# Patient Record
Sex: FEMALE | Race: WHITE | NOT HISPANIC OR LATINO | Employment: FULL TIME | URBAN - METROPOLITAN AREA
[De-identification: names, ages, dates, MRNs, and addresses within clinical notes are randomized per-mention and may not be internally consistent; named-entity substitution may affect disease eponyms.]

---
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
## 2021-07-01 ENCOUNTER — LAB REQUISITION (OUTPATIENT)
Dept: LAB | Facility: HOSPITAL | Age: 21
End: 2021-07-01

## 2021-07-01 DIAGNOSIS — Z00.00 ENCOUNTER FOR GENERAL ADULT MEDICAL EXAMINATION WITHOUT ABNORMAL FINDINGS: ICD-10-CM

## 2021-07-01 LAB — SARS-COV-2 ORF1AB RESP QL NAA+PROBE: NOT DETECTED

## 2021-07-01 PROCEDURE — U0004 COV-19 TEST NON-CDC HGH THRU: HCPCS | Performed by: OTOLARYNGOLOGY

## 2021-07-02 ENCOUNTER — HOSPITAL ENCOUNTER (OUTPATIENT)
Dept: CARDIOLOGY | Facility: HOSPITAL | Age: 21
Discharge: HOME | End: 2021-07-02

## 2021-07-02 ENCOUNTER — TRANSCRIBE ORDERS (OUTPATIENT)
Dept: ADMINISTRATIVE | Facility: HOSPITAL | Age: 21
End: 2021-07-02

## 2021-07-02 ENCOUNTER — LAB (OUTPATIENT)
Dept: LAB | Facility: HOSPITAL | Age: 21
End: 2021-07-02

## 2021-07-02 DIAGNOSIS — Z01.818 PRE-OP EXAM: ICD-10-CM

## 2021-07-02 DIAGNOSIS — Z01.818 PRE-OP EXAM: Primary | ICD-10-CM

## 2021-07-02 LAB
ANION GAP SERPL CALCULATED.3IONS-SCNC: 8.3 MMOL/L (ref 5–15)
APTT PPP: 28.3 SECONDS (ref 22.7–35.4)
BUN SERPL-MCNC: 16 MG/DL (ref 6–20)
BUN/CREAT SERPL: 17.8 (ref 7–25)
CALCIUM SPEC-SCNC: 8.9 MG/DL (ref 8.6–10.5)
CHLORIDE SERPL-SCNC: 105 MMOL/L (ref 98–107)
CO2 SERPL-SCNC: 23.7 MMOL/L (ref 22–29)
CREAT SERPL-MCNC: 0.9 MG/DL (ref 0.57–1)
DEPRECATED RDW RBC AUTO: 48 FL (ref 37–54)
ERYTHROCYTE [DISTWIDTH] IN BLOOD BY AUTOMATED COUNT: 12.7 % (ref 12.3–15.4)
GFR SERPL CREATININE-BSD FRML MDRD: 79 ML/MIN/1.73
GLUCOSE SERPL-MCNC: 82 MG/DL (ref 65–99)
HCG INTACT+B SERPL-ACNC: <0.5 MIU/ML
HCT VFR BLD AUTO: 42.9 % (ref 34–46.6)
HGB BLD-MCNC: 13.9 G/DL (ref 12–15.9)
INR PPP: 0.93 (ref 0.9–1.1)
MCH RBC QN AUTO: 32.3 PG (ref 26.6–33)
MCHC RBC AUTO-ENTMCNC: 32.4 G/DL (ref 31.5–35.7)
MCV RBC AUTO: 99.5 FL (ref 79–97)
PLATELET # BLD AUTO: 395 10*3/MM3 (ref 140–450)
PMV BLD AUTO: 9.3 FL (ref 6–12)
POTASSIUM SERPL-SCNC: 4.9 MMOL/L (ref 3.5–5.2)
PROTHROMBIN TIME: 12.3 SECONDS (ref 11.7–14.2)
QT INTERVAL: 392 MS
RBC # BLD AUTO: 4.31 10*6/MM3 (ref 3.77–5.28)
SODIUM SERPL-SCNC: 137 MMOL/L (ref 136–145)
WBC # BLD AUTO: 9.84 10*3/MM3 (ref 3.4–10.8)

## 2021-07-02 PROCEDURE — 85730 THROMBOPLASTIN TIME PARTIAL: CPT

## 2021-07-02 PROCEDURE — 85610 PROTHROMBIN TIME: CPT

## 2021-07-02 PROCEDURE — 80048 BASIC METABOLIC PNL TOTAL CA: CPT

## 2021-07-02 PROCEDURE — 93005 ELECTROCARDIOGRAM TRACING: CPT | Performed by: OTOLARYNGOLOGY

## 2021-07-02 PROCEDURE — 85027 COMPLETE CBC AUTOMATED: CPT

## 2021-07-02 PROCEDURE — 36415 COLL VENOUS BLD VENIPUNCTURE: CPT

## 2021-07-02 PROCEDURE — 93010 ELECTROCARDIOGRAM REPORT: CPT | Performed by: INTERNAL MEDICINE

## 2021-07-02 PROCEDURE — 84702 CHORIONIC GONADOTROPIN TEST: CPT

## 2021-07-06 ENCOUNTER — LAB REQUISITION (OUTPATIENT)
Dept: LAB | Facility: HOSPITAL | Age: 21
End: 2021-07-06

## 2021-07-06 DIAGNOSIS — Z00.00 ENCOUNTER FOR GENERAL ADULT MEDICAL EXAMINATION WITHOUT ABNORMAL FINDINGS: ICD-10-CM

## 2021-07-06 PROCEDURE — 88304 TISSUE EXAM BY PATHOLOGIST: CPT | Performed by: OTOLARYNGOLOGY

## 2021-07-07 LAB
LAB AP CASE REPORT: NORMAL
LAB AP CLINICAL INFORMATION: NORMAL
PATH REPORT.FINAL DX SPEC: NORMAL
PATH REPORT.GROSS SPEC: NORMAL

## 2021-08-14 ENCOUNTER — IMMUNIZATION (OUTPATIENT)
Dept: VACCINE CLINIC | Facility: HOSPITAL | Age: 21
End: 2021-08-14

## 2021-08-14 PROCEDURE — 0001A: CPT | Performed by: INTERNAL MEDICINE

## 2021-08-14 PROCEDURE — 91300 HC SARSCOV02 VAC 30MCG/0.3ML IM: CPT | Performed by: INTERNAL MEDICINE

## 2021-09-04 ENCOUNTER — IMMUNIZATION (OUTPATIENT)
Dept: VACCINE CLINIC | Facility: HOSPITAL | Age: 21
End: 2021-09-04

## 2021-09-04 PROCEDURE — 91300 HC SARSCOV02 VAC 30MCG/0.3ML IM: CPT | Performed by: INTERNAL MEDICINE

## 2021-09-04 PROCEDURE — 0002A: CPT | Performed by: INTERNAL MEDICINE

## 2022-12-09 ENCOUNTER — INITIAL PRENATAL (OUTPATIENT)
Dept: OBSTETRICS AND GYNECOLOGY | Facility: CLINIC | Age: 22
End: 2022-12-09

## 2022-12-09 VITALS — SYSTOLIC BLOOD PRESSURE: 112 MMHG | DIASTOLIC BLOOD PRESSURE: 73 MMHG | WEIGHT: 189 LBS | BODY MASS INDEX: 31.45 KG/M2

## 2022-12-09 DIAGNOSIS — Z34.90 EARLY STAGE OF PREGNANCY: Primary | ICD-10-CM

## 2022-12-09 DIAGNOSIS — Z34.90 PREGNANCY WITH UNCERTAIN DATES, ANTEPARTUM: ICD-10-CM

## 2022-12-09 LAB
B-HCG UR QL: POSITIVE
EXPIRATION DATE: ABNORMAL
GLUCOSE UR STRIP-MCNC: NEGATIVE MG/DL
INTERNAL NEGATIVE CONTROL: NEGATIVE
INTERNAL POSITIVE CONTROL: POSITIVE
Lab: ABNORMAL
PROT UR STRIP-MCNC: NEGATIVE MG/DL

## 2022-12-09 PROCEDURE — 0501F PRENATAL FLOW SHEET: CPT | Performed by: OBSTETRICS & GYNECOLOGY

## 2022-12-09 PROCEDURE — 81025 URINE PREGNANCY TEST: CPT | Performed by: OBSTETRICS & GYNECOLOGY

## 2022-12-09 RX ORDER — DEXTROAMPHETAMINE SACCHARATE, AMPHETAMINE ASPARTATE MONOHYDRATE, DEXTROAMPHETAMINE SULFATE AND AMPHETAMINE SULFATE 5; 5; 5; 5 MG/1; MG/1; MG/1; MG/1
20 CAPSULE, EXTENDED RELEASE ORAL DAILY
COMMUNITY
Start: 2022-10-26 | End: 2023-01-06

## 2022-12-09 RX ORDER — BUSPIRONE HYDROCHLORIDE 5 MG/1
7.5 TABLET ORAL
COMMUNITY
Start: 2022-10-26 | End: 2023-01-06

## 2022-12-09 RX ORDER — PRENATAL VIT/IRON FUM/FOLIC AC 27MG-0.8MG
1 TABLET ORAL DAILY
COMMUNITY

## 2022-12-09 NOTE — PROGRESS NOTES
Chief Complaint   Patient presents with   • Initial Prenatal Visit     HPI- Pt is 22 y.o.  at Unknown here for prenatal visit.  Patient presents for initial OB visit.  She is unsure regarding her LMP but states it was before .  She has no complaints today.  She states she has had 1 episode of nausea and vomiting.  She has no major underlying medical problems and is taking prenatal vitamins.    ROS-     - No vaginal bleeding    GI- No abdominal pain    /73   Wt 85.7 kg (189 lb)   LMP  (LMP Unknown)   BMI 31.45 kg/m²   Exam - See flow sheet    Fetal heart rate is normal    Assessment-  Diagnoses and all orders for this visit:    Early stage of pregnancy  -     OB Panel With HIV  -     Urine Culture - Urine, Urine, Clean Catch  -     IGP,CtNgTv,rfx Aptima HPV ASCU  -     Drug Profile Urine - 9 Drugs - Urine, Clean Catch    Pregnancy with uncertain dates, antepartum  -     US Ob Transvaginal; Future    Other orders  -     POC Urinalysis Dipstick  -     Prenatal Vit-Fe Fumarate-FA (prenatal vitamin 27-0.8) 27-0.8 MG tablet tablet; Take 1 tablet by mouth Daily.      Initial OB counseling was done.  Discussed delivering hospital and call system.  OB labs and ultrasound were ordered and she will follow-up in 4 weeks.

## 2022-12-10 LAB
ABO GROUP BLD: ABNORMAL
AMPHETAMINES UR QL SCN: NEGATIVE NG/ML
BARBITURATES UR QL SCN: NEGATIVE NG/ML
BASOPHILS # BLD AUTO: 0.1 X10E3/UL (ref 0–0.2)
BASOPHILS NFR BLD AUTO: 1 %
BENZODIAZ UR QL: NEGATIVE NG/ML
BLD GP AB SCN SERPL QL: NEGATIVE
BZE UR QL: NEGATIVE NG/ML
CANNABINOIDS UR QL SCN: NEGATIVE NG/ML
EOSINOPHIL # BLD AUTO: 0.5 X10E3/UL (ref 0–0.4)
EOSINOPHIL NFR BLD AUTO: 5 %
ERYTHROCYTE [DISTWIDTH] IN BLOOD BY AUTOMATED COUNT: 11.8 % (ref 11.7–15.4)
HBV SURFACE AG SERPL QL IA: NEGATIVE
HCT VFR BLD AUTO: 40 % (ref 34–46.6)
HCV AB S/CO SERPL IA: 0.2 S/CO RATIO (ref 0–0.9)
HGB BLD-MCNC: 13.9 G/DL (ref 11.1–15.9)
HIV 1+2 AB+HIV1 P24 AG SERPL QL IA: NON REACTIVE
IMM GRANULOCYTES # BLD AUTO: 0 X10E3/UL (ref 0–0.1)
IMM GRANULOCYTES NFR BLD AUTO: 0 %
LYMPHOCYTES # BLD AUTO: 2.3 X10E3/UL (ref 0.7–3.1)
LYMPHOCYTES NFR BLD AUTO: 20 %
MCH RBC QN AUTO: 32 PG (ref 26.6–33)
MCHC RBC AUTO-ENTMCNC: 34.8 G/DL (ref 31.5–35.7)
MCV RBC AUTO: 92 FL (ref 79–97)
METHADONE UR QL SCN: NEGATIVE NG/ML
MONOCYTES # BLD AUTO: 0.9 X10E3/UL (ref 0.1–0.9)
MONOCYTES NFR BLD AUTO: 8 %
NEUTROPHILS # BLD AUTO: 7.6 X10E3/UL (ref 1.4–7)
NEUTROPHILS NFR BLD AUTO: 66 %
OPIATES UR QL: NEGATIVE NG/ML
PCP UR QL: NEGATIVE NG/ML
PLATELET # BLD AUTO: 431 X10E3/UL (ref 150–450)
PROPOXYPH UR QL SCN: NEGATIVE NG/ML
RBC # BLD AUTO: 4.35 X10E6/UL (ref 3.77–5.28)
RH BLD: POSITIVE
RPR SER QL: NON REACTIVE
RUBV IGG SERPL IA-ACNC: 1.51 INDEX
WBC # BLD AUTO: 11.4 X10E3/UL (ref 3.4–10.8)

## 2022-12-11 LAB
BACTERIA UR CULT: NORMAL
BACTERIA UR CULT: NORMAL

## 2022-12-21 LAB
C TRACH RRNA CVX QL NAA+PROBE: NEGATIVE
CONV .: ABNORMAL
CYTOLOGIST CVX/VAG CYTO: ABNORMAL
CYTOLOGY CVX/VAG DOC CYTO: ABNORMAL
CYTOLOGY CVX/VAG DOC THIN PREP: ABNORMAL
DX ICD CODE: ABNORMAL
DX ICD CODE: ABNORMAL
HIV 1 & 2 AB SER-IMP: ABNORMAL
HPV I/H RISK 4 DNA CVX QL PROBE+SIG AMP: POSITIVE
N GONORRHOEA RRNA CVX QL NAA+PROBE: NEGATIVE
OTHER STN SPEC: ABNORMAL
PATHOLOGIST CVX/VAG CYTO: ABNORMAL
RECOM F/U CVX/VAG CYTO: ABNORMAL
STAT OF ADQ CVX/VAG CYTO-IMP: ABNORMAL
T VAGINALIS RRNA SPEC QL NAA+PROBE: NEGATIVE

## 2023-01-06 ENCOUNTER — ROUTINE PRENATAL (OUTPATIENT)
Dept: OBSTETRICS AND GYNECOLOGY | Facility: CLINIC | Age: 23
End: 2023-01-06
Payer: COMMERCIAL

## 2023-01-06 VITALS — SYSTOLIC BLOOD PRESSURE: 110 MMHG | DIASTOLIC BLOOD PRESSURE: 75 MMHG | WEIGHT: 193 LBS | BODY MASS INDEX: 32.12 KG/M2

## 2023-01-06 DIAGNOSIS — Z34.01 ENCOUNTER FOR SUPERVISION OF NORMAL FIRST PREGNANCY IN FIRST TRIMESTER: Primary | ICD-10-CM

## 2023-01-06 DIAGNOSIS — Z36.0 ENCOUNTER FOR ANTENATAL SCREENING FOR CHROMOSOMAL ANOMALIES: ICD-10-CM

## 2023-01-06 LAB
GLUCOSE UR STRIP-MCNC: NEGATIVE MG/DL
PROT UR STRIP-MCNC: NEGATIVE MG/DL

## 2023-01-06 PROCEDURE — 0502F SUBSEQUENT PRENATAL CARE: CPT | Performed by: OBSTETRICS & GYNECOLOGY

## 2023-01-06 NOTE — PROGRESS NOTES
CC:  Pregnancy  She is overall doing well.  She does have some round ligament pain.  She does report nausea in the morning, but states that saltines help with the nausea and she declines any antinausea medication.  Reviewed initial OB labs and discussed ASCUS Pap that was positive for HPV and discussed recommendations for repeat Pap at 1 year.  She does desire cell free DNA testing and order was placed.  Ultrasound today confirms a viable pregnancy at 9 weeks 5 days.  A/P: Supervision of pregnancy at 9 weeks  -- Follow-up in 4 weeks

## 2023-01-13 LAB
CFDNA.FET/CFDNA.TOTAL SFR FETUS: NORMAL %
CITATION REF LAB TEST: NORMAL
FET 13+18+21+X+Y ANEUP PLAS.CFDNA: NEGATIVE
FET CHR 21 TS PLAS.CFDNA QL: NEGATIVE
FET SEX PLAS.CFDNA DOSAGE CFDNA: NORMAL
FET TS 13 RISK PLAS.CFDNA QL: NEGATIVE
FET TS 18 RISK WBC.DNA+CFDNA QL: NEGATIVE
GA EST FROM CONCEPTION DATE: NORMAL D
GESTATIONAL AGE > 9:: YES
LAB DIRECTOR NAME PROVIDER: NORMAL
LAB DIRECTOR NAME PROVIDER: NORMAL
LABORATORY COMMENT REPORT: NORMAL
LIMITATIONS OF THE TEST: NORMAL
NEGATIVE PREDICTIVE VALUE: NORMAL
NOTE: NORMAL
PERFORMANCE CHARACTERISTICS: NORMAL
POSITIVE PREDICTIVE VALUE: NORMAL
REF LAB TEST METHOD: NORMAL
TEST PERFORMANCE INFO SPEC: NORMAL

## 2023-01-16 ENCOUNTER — TELEPHONE (OUTPATIENT)
Dept: OBSTETRICS AND GYNECOLOGY | Facility: CLINIC | Age: 23
End: 2023-01-16
Payer: COMMERCIAL

## 2023-01-16 NOTE — TELEPHONE ENCOUNTER
----- Message from Akila Miranda MD sent at 1/16/2023  8:10 AM EST -----  Please let patient know that her genetic testing was normal, and if she wants to know gender, it showed a female fetus

## 2023-01-17 ENCOUNTER — TELEPHONE (OUTPATIENT)
Dept: OBSTETRICS AND GYNECOLOGY | Facility: CLINIC | Age: 23
End: 2023-01-17
Payer: COMMERCIAL

## 2023-01-17 NOTE — TELEPHONE ENCOUNTER
----- Message from Akila Miranda MD sent at 1/17/2023 12:15 PM EST -----  Regarding: FW: Depression   Contact: [phone]  Can you please reach out to the patient with counseling services  ----- Message -----  From: Fanny Jeffers RN  Sent: 1/17/2023  12:05 PM EST  To: Akila Miranda MD  Subject: Depression                                       My Chart response. 11wk2d. OB 2/3/23. Rosalina has decided against restarting Lexapro, but would like suggestions/referral for counseling. Thank you.      ----- Message -----  From: Rosalina Pardo  Sent: 1/17/2023  11:42 AM EST  To: [facility]  Subject: Depression                                       Yes counseling would be helpful if you have any suggestions

## 2023-01-17 NOTE — TELEPHONE ENCOUNTER
----- Message from Akila Miranda MD sent at 1/17/2023 10:18 AM EST -----  Regarding: FW: Depression   Contact: [phone]  I can always restart her Lexapro if she would like to go back on that.  The risks of Lexapro in pregnancy are very small and the risk of uncontrolled depression or anxiety are greater to her pregnancy.  ----- Message -----  From: Fanny Jeffers RN  Sent: 1/16/2023   4:02 PM EST  To: Akila Miranda MD  Subject: RE: Depression                                   My Chart. 11wk1d. OB 2/3/23. Stopped taking her lexapro when she found out she is pregnant. Anything to help?  I sent a message of [facility] pharmacy on file. Thank you.    ----- Message -----  From: Rosalina Redman  Sent: 1/16/2023   3:47 PM EST  To: [facility]  Subject: Depression                                       I have had very bad depression the last week and I’m not sure what to do or if there is anything to help me I stopped taking my alexpro when I found out I was pregnant     I’m hoping it’s just the 1st trimester so maybe it will get better in a couple weeks   Thank you   Rosalina redman

## 2023-01-17 NOTE — TELEPHONE ENCOUNTER
L/m for pt to call.      Also sent Prixtel msg to pt with the following information, please relay if she calls back:     Your referral and records have been faxed to [facility].  Please call them at your earliest convenience to schedule an appointment, at [phone] option 3.        Pt # [phone]

## 2023-01-17 NOTE — TELEPHONE ENCOUNTER
Left message for Rosalina that Dr Miranda said she can always restart your Lexapro if you would like to go back on that. The risks of Lexapro in pregnancy are very small and the risk of uncontrolled depression or anxiety are greater to your pregnancy. I will leave a My Chart message as well. Thank you.

## 2023-01-18 NOTE — TELEPHONE ENCOUNTER
On 1/18/23 at 8:16 am, pt viewed mychart msg with instructions to call [facility] to schedule.

## 2023-02-03 ENCOUNTER — ROUTINE PRENATAL (OUTPATIENT)
Dept: OBSTETRICS AND GYNECOLOGY | Facility: CLINIC | Age: 23
End: 2023-02-03
Payer: COMMERCIAL

## 2023-02-03 VITALS — SYSTOLIC BLOOD PRESSURE: 112 MMHG | BODY MASS INDEX: 32.78 KG/M2 | DIASTOLIC BLOOD PRESSURE: 75 MMHG | WEIGHT: 197 LBS

## 2023-02-03 DIAGNOSIS — G93.5 CHIARI I MALFORMATION: ICD-10-CM

## 2023-02-03 DIAGNOSIS — Z34.02 ENCOUNTER FOR SUPERVISION OF NORMAL FIRST PREGNANCY IN SECOND TRIMESTER: Primary | ICD-10-CM

## 2023-02-03 PROCEDURE — 0502F SUBSEQUENT PRENATAL CARE: CPT | Performed by: OBSTETRICS & GYNECOLOGY

## 2023-02-03 NOTE — PROGRESS NOTES
CC:  Pregnancy  Patient is doing well today with no major complaints.  Patient was last seen by neurology in 2019 and I have advised her to see them again to ensure that she is okay to have a vaginal delivery with her history of Chiari I malformation.  She previously saw [facility] and consult was placed for her to reestablish care with them.  A/P:  Supervision of pregnancy at 13 weeks with history of Chiari I malformation  -- Follow-up in 4 weeks

## 2023-02-07 ENCOUNTER — TELEPHONE (OUTPATIENT)
Dept: OBSTETRICS AND GYNECOLOGY | Facility: CLINIC | Age: 23
End: 2023-02-07
Payer: COMMERCIAL

## 2023-02-07 NOTE — TELEPHONE ENCOUNTER
FYI. Patient was referred to Neurology to see Dr Xander Houston. Office called stating Dr Houston only sees patients at  hospital  not in office. Asked if she may be switch to another doctor in practice. Per Davida informed them she may.

## 2023-02-14 ENCOUNTER — TELEPHONE (OUTPATIENT)
Dept: OBSTETRICS AND GYNECOLOGY | Facility: CLINIC | Age: 23
End: 2023-02-14
Payer: COMMERCIAL

## 2023-02-14 NOTE — TELEPHONE ENCOUNTER
Appears neuro contacted our office on 2/7/23 regarding pt's referral.  We gave permission to schedule with another provider.  As of 2/14/23, appt still not scheduled.      L/m for pt to call.  Please provide her with phone number to neurology to call ASAP to schedule appt, [phone].     Pt # [phone]

## 2023-02-14 NOTE — TELEPHONE ENCOUNTER
Patient is calling to let provider know that nobody has contacted her within this week about the referral.    Thank you!

## 2023-02-20 NOTE — TELEPHONE ENCOUNTER
Referral/faxed to [facility] for scheduling.  They will call pt to schedule.  Office # [phone]

## 2023-02-20 NOTE — TELEPHONE ENCOUNTER
Patient would like to have referral sent to another office. I told patient she could research any neurology office. She said to send in anywhere.     Thank you!

## 2023-03-03 ENCOUNTER — ROUTINE PRENATAL (OUTPATIENT)
Dept: OBSTETRICS AND GYNECOLOGY | Facility: CLINIC | Age: 23
End: 2023-03-03
Payer: COMMERCIAL

## 2023-03-03 VITALS — BODY MASS INDEX: 33.78 KG/M2 | WEIGHT: 203 LBS | SYSTOLIC BLOOD PRESSURE: 107 MMHG | DIASTOLIC BLOOD PRESSURE: 72 MMHG

## 2023-03-03 DIAGNOSIS — Z36.86 ENCOUNTER FOR ANTENATAL SCREENING FOR CERVICAL LENGTH: ICD-10-CM

## 2023-03-03 DIAGNOSIS — Z36.89 ENCOUNTER FOR FETAL ANATOMIC SURVEY: ICD-10-CM

## 2023-03-03 DIAGNOSIS — R82.90 ABNORMAL URINALYSIS: Primary | ICD-10-CM

## 2023-03-03 DIAGNOSIS — G93.5 CHIARI I MALFORMATION: ICD-10-CM

## 2023-03-03 DIAGNOSIS — Z3A.17 17 WEEKS GESTATION OF PREGNANCY: ICD-10-CM

## 2023-03-03 LAB
GLUCOSE UR STRIP-MCNC: NEGATIVE MG/DL
PROT UR STRIP-MCNC: NEGATIVE MG/DL

## 2023-03-03 PROCEDURE — 0502F SUBSEQUENT PRENATAL CARE: CPT | Performed by: OBSTETRICS & GYNECOLOGY

## 2023-03-03 RX ORDER — NITROFURANTOIN 25; 75 MG/1; MG/1
100 CAPSULE ORAL 2 TIMES DAILY
Qty: 14 CAPSULE | Refills: 0 | Status: SHIPPED | OUTPATIENT
Start: 2023-03-03 | End: 2023-03-10

## 2023-03-03 NOTE — PROGRESS NOTES
CC:  Pregnancy  Patient is overall doing well and has no major complaints.  She is scheduled in May to see neurology due to history of Chiari I malformation.  Discussed recommendations for anatomy ultrasound in 3 weeks.  She was offered screening for spina bifida and declines.  Her urinalysis was positive for nitrates today and she is denying any dysuria.  Urine culture has been sent and I have advised her to go ahead and start antibiotics until culture has returned.  A/P: Supervision of pregnancy at 17 weeks with history of Chiari I malformation and abnormal urinalysis  -- Follow-up in 3 weeks with anatomy ultrasound

## 2023-03-06 ENCOUNTER — TELEPHONE (OUTPATIENT)
Dept: OBSTETRICS AND GYNECOLOGY | Facility: CLINIC | Age: 23
End: 2023-03-06
Payer: COMMERCIAL

## 2023-03-06 NOTE — TELEPHONE ENCOUNTER
Patient 18 weeks pregnant. Due for next OB and anatomy  ultrasound 3/24. She wants to wait until 4/19 due to school. Is this OK?

## 2023-03-09 LAB
BACTERIA UR CULT: ABNORMAL
BACTERIA UR CULT: ABNORMAL
OTHER ANTIBIOTIC SUSC ISLT: ABNORMAL

## 2023-04-19 ENCOUNTER — ROUTINE PRENATAL (OUTPATIENT)
Dept: OBSTETRICS AND GYNECOLOGY | Facility: CLINIC | Age: 23
End: 2023-04-19
Payer: COMMERCIAL

## 2023-04-19 VITALS — SYSTOLIC BLOOD PRESSURE: 100 MMHG | DIASTOLIC BLOOD PRESSURE: 66 MMHG | WEIGHT: 210 LBS | BODY MASS INDEX: 34.95 KG/M2

## 2023-04-19 DIAGNOSIS — Z13.1 SCREENING FOR DIABETES MELLITUS: ICD-10-CM

## 2023-04-19 DIAGNOSIS — Z3A.24 24 WEEKS GESTATION OF PREGNANCY: ICD-10-CM

## 2023-04-19 DIAGNOSIS — Z13.0 SCREENING FOR IRON DEFICIENCY ANEMIA: ICD-10-CM

## 2023-04-19 DIAGNOSIS — G93.5 CHIARI I MALFORMATION: ICD-10-CM

## 2023-04-19 DIAGNOSIS — O23.42 URINARY TRACT INFECTION IN MOTHER DURING SECOND TRIMESTER OF PREGNANCY: Primary | ICD-10-CM

## 2023-04-19 PROCEDURE — 0502F SUBSEQUENT PRENATAL CARE: CPT | Performed by: OBSTETRICS & GYNECOLOGY

## 2023-04-19 NOTE — PROGRESS NOTES
CC:  Pregnancy  She is doing well and has no major complaints.  She does report good fetal movement.  She was positive for a UTI at her last visit and she states she did complete the entire antibiotic.  We will resend urine today.  She is scheduled in May to see neurology regarding her Chiari malformation.  Discussed 1 hour glucose test with her next visit.  A/P: Supervision of pregnancy at 24 weeks with history of Chiari I malformation and UTI  -- Follow-up in 4 weeks

## 2023-04-24 LAB
BACTERIA UR CULT: ABNORMAL
BACTERIA UR CULT: ABNORMAL
OTHER ANTIBIOTIC SUSC ISLT: ABNORMAL

## 2023-04-25 RX ORDER — NITROFURANTOIN 25; 75 MG/1; MG/1
100 CAPSULE ORAL 2 TIMES DAILY
Qty: 14 CAPSULE | Refills: 0 | Status: SHIPPED | OUTPATIENT
Start: 2023-04-25 | End: 2023-05-02

## 2023-05-26 ENCOUNTER — TELEPHONE (OUTPATIENT)
Dept: NEUROSURGERY | Facility: CLINIC | Age: 23
End: 2023-05-26
Payer: COMMERCIAL

## 2023-05-26 ENCOUNTER — ROUTINE PRENATAL (OUTPATIENT)
Dept: OBSTETRICS AND GYNECOLOGY | Facility: CLINIC | Age: 23
End: 2023-05-26
Payer: COMMERCIAL

## 2023-05-26 ENCOUNTER — OFFICE VISIT (OUTPATIENT)
Dept: NEUROLOGY | Facility: CLINIC | Age: 23
End: 2023-05-26
Payer: COMMERCIAL

## 2023-05-26 VITALS
HEART RATE: 72 BPM | SYSTOLIC BLOOD PRESSURE: 98 MMHG | DIASTOLIC BLOOD PRESSURE: 68 MMHG | OXYGEN SATURATION: 98 % | HEIGHT: 65 IN | BODY MASS INDEX: 34.95 KG/M2

## 2023-05-26 VITALS — WEIGHT: 215 LBS | DIASTOLIC BLOOD PRESSURE: 77 MMHG | BODY MASS INDEX: 35.78 KG/M2 | SYSTOLIC BLOOD PRESSURE: 111 MMHG

## 2023-05-26 DIAGNOSIS — G93.5 CHIARI I MALFORMATION: Primary | ICD-10-CM

## 2023-05-26 DIAGNOSIS — G93.5 CHIARI I MALFORMATION: ICD-10-CM

## 2023-05-26 DIAGNOSIS — O99.210 MATERNAL OBESITY AFFECTING PREGNANCY, ANTEPARTUM: ICD-10-CM

## 2023-05-26 DIAGNOSIS — Z3A.29 29 WEEKS GESTATION OF PREGNANCY: ICD-10-CM

## 2023-05-26 DIAGNOSIS — O23.42 URINARY TRACT INFECTION IN MOTHER DURING SECOND TRIMESTER OF PREGNANCY: Primary | ICD-10-CM

## 2023-05-26 LAB
ERYTHROCYTE [DISTWIDTH] IN BLOOD BY AUTOMATED COUNT: 12 % (ref 12.3–15.4)
GLUCOSE UR STRIP-MCNC: NEGATIVE MG/DL
HCT VFR BLD AUTO: 35.4 % (ref 34–46.6)
HGB BLD-MCNC: 12.3 G/DL (ref 12–15.9)
MCH RBC QN AUTO: 32.1 PG (ref 26.6–33)
MCHC RBC AUTO-ENTMCNC: 34.7 G/DL (ref 31.5–35.7)
MCV RBC AUTO: 92.4 FL (ref 79–97)
PLATELET # BLD AUTO: 349 10*3/MM3 (ref 140–450)
PROT UR STRIP-MCNC: NEGATIVE MG/DL
RBC # BLD AUTO: 3.83 10*6/MM3 (ref 3.77–5.28)
WBC # BLD AUTO: 14.67 10*3/MM3 (ref 3.4–10.8)

## 2023-05-26 NOTE — LETTER
May 26, 2023       No Recipients    Patient: Rosalina Pardo   YOB: 2000   Date of Visit: 2023       Dear Dr. Ruiz Recipients:    Thank you for referring Rosalina Pardo to me for evaluation. Below are the relevant portions of my assessment and plan of care.    If you have questions, please do not hesitate to call me. I look forward to following Rosalina along with you.         Sincerely,        Torres Triana II, MD        CC:   No Recipients    Torres Triana II, MD  23 1021  Signed  Chief Complaint   Patient presents with   • Chiari malformation       Patient ID: Rosalina Pardo is a 23 y.o. female.    HPI: I had the pleasure of seeing your patient today.  As you may know she is a 23-year-old female being seen at the request of and referred to us by IKER Kohler for the evaluation of Chiari malformation.  She last had an MRI back in  showing borderline Chiari I malformation with 5 mm of tonsillar ectopia.  There was no significant pointing of the tonsils or mass effect appreciated at the cervical medullary junction.  This appearance was unchanged from her previous MRI from 2012.  She has also a thoracic syrinx.  She has been seen by neurosurgery in the past and at that time she was not in need of surgical intervention.  She is currently pregnant and due in August.  They had a specific question for us regarding method of delivery of her child.  She would like to have a vaginal delivery however the OB/GYN team is inquiring about the possible necessity of .  She would prefer vaginal delivery.  She has not seen Dr. Mendez in quite some time.  She denies any new onset focal weakness or numbness of her arms or legs.  No cape like numbness of her arms or back.  No balance issues from baseline.  No vision changes.    The following portions of the patient's history were reviewed and updated as appropriate: allergies, current medications, past family history, past medical  history, past social history, past surgical history and problem list.    Review of Systems   Constitutional: Negative for chills, fatigue and fever.   HENT: Negative for hearing loss, tinnitus and trouble swallowing.    Eyes: Negative for photophobia, redness and visual disturbance.   Respiratory: Negative for cough, shortness of breath and wheezing.    Cardiovascular: Negative for chest pain, palpitations and leg swelling.   Gastrointestinal: Negative for diarrhea, nausea and vomiting.   Endocrine: Negative for cold intolerance, heat intolerance and polydipsia.   Genitourinary: Negative for decreased urine volume, difficulty urinating and urgency.   Musculoskeletal: Negative for back pain, neck pain and neck stiffness.   Skin: Negative for color change, rash and wound.   Allergic/Immunologic: Negative for environmental allergies, food allergies and immunocompromised state.   Neurological: Positive for headaches (pressure). Negative for dizziness, tremors, seizures, syncope, facial asymmetry, speech difficulty, weakness, light-headedness and numbness.   Hematological: Negative for adenopathy. Does not bruise/bleed easily.   Psychiatric/Behavioral: Negative for confusion, decreased concentration and sleep disturbance. The patient is not nervous/anxious.       I have reviewed the review of systems above performed by my medical assistant.      Vitals:    05/26/23 0917   BP: 98/68   Pulse: 72   SpO2: 98%       Neurologic Exam     Mental Status   Oriented to person, place, and time.   Registration: recalls 3 of 3 objects. Follows 3 step commands.   Attention: normal. Concentration: normal.   Speech: speech is normal   Level of consciousness: alert  Knowledge: consistent with education (No deficits found.).   Normal comprehension.     Cranial Nerves     CN II   Visual fields full to confrontation.     CN III, IV, VI   Pupils are equal, round, and reactive to light.  Extraocular motions are normal.   CN III: no CN III  palsy  CN VI: no CN VI palsy  Nystagmus: none   Diplopia: none    CN V   Facial sensation intact.     CN VII   Facial expression full, symmetric.     CN VIII   CN VIII normal.     CN IX, X   CN IX normal.   CN X normal.     CN XI   CN XI normal.     CN XII   CN XII normal.     Motor Exam   Muscle bulk: normal  Right arm tone: normal  Left arm tone: normal  Right leg tone: normal  Left leg tone: normal    Strength   Right neck flexion: 5/5  Left neck flexion: 5/5  Right neck extension: 5/5  Left neck extension: 5/5  Right deltoid: 5/5  Left deltoid: 5/5  Right biceps: 5/5  Left biceps: 5/5  Right triceps: 5/5  Left triceps: 5/5  Right wrist flexion: 5/5  Left wrist flexion: 5/5  Right wrist extension: 5/5  Left wrist extension: 5/5  Right interossei: 5/5  Left interossei: 5/5  Right abdominals: 5/5  Left abdominals: 5/5  Right iliopsoas: 5/5  Left iliopsoas: 5/5  Right quadriceps: 5/5  Left quadriceps: 5/5  Right hamstrin/5  Left hamstrin/5  Right glutei: 5/5  Left glutei: 5/5  Right anterior tibial: 5/5  Left anterior tibial: 5/5  Right posterior tibial: 5/5  Left posterior tibial: 5/5  Right peroneal: 5/5  Left peroneal: 5/5  Right gastroc: 5/5  Left gastroc: 5/5    Sensory Exam   Light touch normal.   Vibration normal.   Proprioception normal.   Pinprick normal.     Gait, Coordination, and Reflexes     Gait  Gait: normal    Coordination   Romberg: negative    Tremor   Resting tremor: absent  Intention tremor: absent    Reflexes   Right brachioradialis: 2+  Left brachioradialis: 2+  Right biceps: 2+  Left biceps: 2+  Right triceps: 2+  Left triceps: 2+  Right patellar: 2+  Left patellar: 2+  Right achilles: 2+  Left achilles: 2+  Right : 2+  Left : 2+Station is normal.       Physical Exam  Vitals reviewed.   Constitutional:       General: She is not in acute distress.     Appearance: She is well-developed.   HENT:      Head: Normocephalic and atraumatic.   Eyes:      Extraocular Movements: EOM  normal.      Pupils: Pupils are equal, round, and reactive to light.   Cardiovascular:      Rate and Rhythm: Normal rate and regular rhythm.      Heart sounds: Normal heart sounds.   Pulmonary:      Effort: Pulmonary effort is normal. No respiratory distress.      Breath sounds: Normal breath sounds.   Abdominal:      General: Bowel sounds are normal. There is no distension.      Palpations: Abdomen is soft.      Tenderness: There is no abdominal tenderness.   Musculoskeletal:         General: No deformity.      Cervical back: Normal range of motion.   Skin:     General: Skin is warm.      Findings: No rash.   Neurological:      Mental Status: She is oriented to person, place, and time.      Coordination: Romberg Test normal.      Gait: Gait is intact.      Deep Tendon Reflexes:      Reflex Scores:       Tricep reflexes are 2+ on the right side and 2+ on the left side.       Bicep reflexes are 2+ on the right side and 2+ on the left side.       Brachioradialis reflexes are 2+ on the right side and 2+ on the left side.       Patellar reflexes are 2+ on the right side and 2+ on the left side.       Achilles reflexes are 2+ on the right side and 2+ on the left side.  Psychiatric:         Speech: Speech normal.         Judgment: Judgment normal.         Procedures    Assessment/Plan: I had a long discussion with her regarding her previous MRI and Chiari malformation and what that means with respect to the possibility of having a child vaginally.  In my opinion I do not feel that that would be an issue for her and that she could.  I do feel that she should also discuss this with her neurosurgeon Dr. Claros.  We will see her here on an as-needed basis. A total of 45 minutes was spent face-to-face with the patient today.  Of that greater than 50% of this time was spent discussing signs and symptoms of Chiari malformation, patient education, plan of care and prognosis.        Diagnoses and all orders for this visit:    1.  Chiari I malformation (Primary)  -     Ambulatory Referral to Neurosurgery          Torres Triana II, MD

## 2023-05-26 NOTE — PROGRESS NOTES
CC:  Pregnancy  Patient states she did complete her antibiotic and we will recheck her urine for infection today.  She does report good fetal movement.  She saw the neurologist this morning due to history of Chiari I malformation and they see no contraindication for a vaginal delivery.  I did discuss with her that we will do an anesthesia consult closer to term.  She is doing her glucose test and CBC today.  A/P: Supervision of pregnancy at 29 weeks with history of Chiari I malformation and UTI  -- She will follow-up in 2 weeks with growth ultrasound

## 2023-05-26 NOTE — PROGRESS NOTES
Chief Complaint   Patient presents with   • Chiari malformation       Patient ID: Rosalina Pardo is a 23 y.o. female.    HPI: I had the pleasure of seeing your patient today.  As you may know she is a 23-year-old female being seen at the request of and referred to us by IKER Kohler for the evaluation of Chiari malformation.  She last had an MRI back in 2019 showing borderline Chiari I malformation with 5 mm of tonsillar ectopia.  There was no significant pointing of the tonsils or mass effect appreciated at the cervical medullary junction.  This appearance was unchanged from her previous MRI from 2012.  She has also a thoracic syrinx.  She has been seen by neurosurgery in the past and at that time she was not in need of surgical intervention.  She is currently pregnant and due in August.  They had a specific question for us regarding method of delivery of her child.  She would like to have a vaginal delivery however the OB/GYN team is inquiring about the possible necessity of .  She would prefer vaginal delivery.  She has not seen Dr. Mendez in quite some time.  She denies any new onset focal weakness or numbness of her arms or legs.  No cape like numbness of her arms or back.  No balance issues from baseline.  No vision changes.    The following portions of the patient's history were reviewed and updated as appropriate: allergies, current medications, past family history, past medical history, past social history, past surgical history and problem list.    Review of Systems   Constitutional: Negative for chills, fatigue and fever.   HENT: Negative for hearing loss, tinnitus and trouble swallowing.    Eyes: Negative for photophobia, redness and visual disturbance.   Respiratory: Negative for cough, shortness of breath and wheezing.    Cardiovascular: Negative for chest pain, palpitations and leg swelling.   Gastrointestinal: Negative for diarrhea, nausea and vomiting.   Endocrine: Negative for  cold intolerance, heat intolerance and polydipsia.   Genitourinary: Negative for decreased urine volume, difficulty urinating and urgency.   Musculoskeletal: Negative for back pain, neck pain and neck stiffness.   Skin: Negative for color change, rash and wound.   Allergic/Immunologic: Negative for environmental allergies, food allergies and immunocompromised state.   Neurological: Positive for headaches (pressure). Negative for dizziness, tremors, seizures, syncope, facial asymmetry, speech difficulty, weakness, light-headedness and numbness.   Hematological: Negative for adenopathy. Does not bruise/bleed easily.   Psychiatric/Behavioral: Negative for confusion, decreased concentration and sleep disturbance. The patient is not nervous/anxious.       I have reviewed the review of systems above performed by my medical assistant.      Vitals:    05/26/23 0917   BP: 98/68   Pulse: 72   SpO2: 98%       Neurologic Exam     Mental Status   Oriented to person, place, and time.   Registration: recalls 3 of 3 objects. Follows 3 step commands.   Attention: normal. Concentration: normal.   Speech: speech is normal   Level of consciousness: alert  Knowledge: consistent with education (No deficits found.).   Normal comprehension.     Cranial Nerves     CN II   Visual fields full to confrontation.     CN III, IV, VI   Pupils are equal, round, and reactive to light.  Extraocular motions are normal.   CN III: no CN III palsy  CN VI: no CN VI palsy  Nystagmus: none   Diplopia: none    CN V   Facial sensation intact.     CN VII   Facial expression full, symmetric.     CN VIII   CN VIII normal.     CN IX, X   CN IX normal.   CN X normal.     CN XI   CN XI normal.     CN XII   CN XII normal.     Motor Exam   Muscle bulk: normal  Right arm tone: normal  Left arm tone: normal  Right leg tone: normal  Left leg tone: normal    Strength   Right neck flexion: 5/5  Left neck flexion: 5/5  Right neck extension: 5/5  Left neck extension:  5/5  Right deltoid: 5/5  Left deltoid: 5/5  Right biceps: 5/5  Left biceps: 5/5  Right triceps: 5/5  Left triceps: 5/5  Right wrist flexion: 5/5  Left wrist flexion: 5/5  Right wrist extension: 5/5  Left wrist extension: 5/5  Right interossei: 5/5  Left interossei: 5/5  Right abdominals: 5/5  Left abdominals: 5/5  Right iliopsoas: 5/5  Left iliopsoas: 5/5  Right quadriceps: 5/5  Left quadriceps: 5/5  Right hamstrin/5  Left hamstrin/5  Right glutei: 5/5  Left glutei: 5/5  Right anterior tibial: 5/5  Left anterior tibial: 5/5  Right posterior tibial: 5/5  Left posterior tibial: 5/5  Right peroneal: 5/5  Left peroneal: 5/5  Right gastroc: 5/5  Left gastroc: 5/5    Sensory Exam   Light touch normal.   Vibration normal.   Proprioception normal.   Pinprick normal.     Gait, Coordination, and Reflexes     Gait  Gait: normal    Coordination   Romberg: negative    Tremor   Resting tremor: absent  Intention tremor: absent    Reflexes   Right brachioradialis: 2+  Left brachioradialis: 2+  Right biceps: 2+  Left biceps: 2+  Right triceps: 2+  Left triceps: 2+  Right patellar: 2+  Left patellar: 2+  Right achilles: 2+  Left achilles: 2+  Right : 2+  Left : 2+Station is normal.       Physical Exam  Vitals reviewed.   Constitutional:       General: She is not in acute distress.     Appearance: She is well-developed.   HENT:      Head: Normocephalic and atraumatic.   Eyes:      Extraocular Movements: EOM normal.      Pupils: Pupils are equal, round, and reactive to light.   Cardiovascular:      Rate and Rhythm: Normal rate and regular rhythm.      Heart sounds: Normal heart sounds.   Pulmonary:      Effort: Pulmonary effort is normal. No respiratory distress.      Breath sounds: Normal breath sounds.   Abdominal:      General: Bowel sounds are normal. There is no distension.      Palpations: Abdomen is soft.      Tenderness: There is no abdominal tenderness.   Musculoskeletal:         General: No deformity.       Cervical back: Normal range of motion.   Skin:     General: Skin is warm.      Findings: No rash.   Neurological:      Mental Status: She is oriented to person, place, and time.      Coordination: Romberg Test normal.      Gait: Gait is intact.      Deep Tendon Reflexes:      Reflex Scores:       Tricep reflexes are 2+ on the right side and 2+ on the left side.       Bicep reflexes are 2+ on the right side and 2+ on the left side.       Brachioradialis reflexes are 2+ on the right side and 2+ on the left side.       Patellar reflexes are 2+ on the right side and 2+ on the left side.       Achilles reflexes are 2+ on the right side and 2+ on the left side.  Psychiatric:         Speech: Speech normal.         Judgment: Judgment normal.         Procedures    Assessment/Plan: I had a long discussion with her regarding her previous MRI and Chiari malformation and what that means with respect to the possibility of having a child vaginally.  In my opinion I do not feel that that would be an issue for her and that she could.  I do feel that she should also discuss this with her neurosurgeon Dr. Claros.  We will see her here on an as-needed basis. A total of 45 minutes was spent face-to-face with the patient today.  Of that greater than 50% of this time was spent discussing signs and symptoms of Chiari malformation, patient education, plan of care and prognosis.         Diagnoses and all orders for this visit:    1. Chiari I malformation (Primary)  -     Ambulatory Referral to Neurosurgery           Torres Triana II, MD

## 2023-05-26 NOTE — TELEPHONE ENCOUNTER
Winnie Cox called requesting clearance for patient to have vaginal birth due to having Chiari malformation. Sent message to .    Call back Winnie   [phone]

## 2023-05-27 LAB — GLUCOSE 1H P 50 G GLC PO SERPL-MCNC: 114 MG/DL (ref 65–139)

## 2023-05-28 LAB
BACTERIA UR CULT: NORMAL
BACTERIA UR CULT: NORMAL

## 2023-06-01 ENCOUNTER — PATIENT ROUNDING (BHMG ONLY) (OUTPATIENT)
Dept: NEUROLOGY | Facility: CLINIC | Age: 23
End: 2023-06-01

## 2023-06-13 ENCOUNTER — ROUTINE PRENATAL (OUTPATIENT)
Dept: OBSTETRICS AND GYNECOLOGY | Facility: CLINIC | Age: 23
End: 2023-06-13
Payer: COMMERCIAL

## 2023-06-13 VITALS — DIASTOLIC BLOOD PRESSURE: 71 MMHG | WEIGHT: 220.4 LBS | SYSTOLIC BLOOD PRESSURE: 111 MMHG | BODY MASS INDEX: 36.68 KG/M2

## 2023-06-13 DIAGNOSIS — Z3A.32 32 WEEKS GESTATION OF PREGNANCY: Primary | ICD-10-CM

## 2023-06-13 DIAGNOSIS — G93.5 CHIARI I MALFORMATION: ICD-10-CM

## 2023-06-13 LAB
GLUCOSE UR STRIP-MCNC: NEGATIVE MG/DL
PROT UR STRIP-MCNC: NEGATIVE MG/DL

## 2023-06-13 NOTE — PROGRESS NOTES
Chief Complaint   Patient presents with    Routine Prenatal Visit     Pt here for routine prenatal visit. 32w2d  Having pain on right side sometimes, not constant.        OB follow up     Rosalina Pardo is a 23 y.o.  32w2d being seen today for her obstetrical visit.  Patient reports rare pain in right side she thinks may be dm torres. Fetal movement: normal.    Review of Systems  Cramping/contractions: denies  Vaginal bleeding: denies  Fetal movement: nromal    /71   Wt 100 kg (220 lb 6.4 oz)   LMP  (LMP Unknown)   BMI 36.68 kg/m²     Assessment/Plan    Diagnoses and all orders for this visit:    1. 32 weeks gestation of pregnancy (Primary)  -     POC Urinalysis Dipstick    2. Chiari I malformation       BPP 8/8, EFW 1984 grams (4lb 6oz) 49.7%tile, AC 48%, HC 34%,ROXANNE 7.71cm, Anterior placenta, Cord doppler S/D ratio = 2.4 with no absent or reverse flow   She will return in 3 days for repeat ROXANNE  She has been seen by Neurology, planning for vaginal delivery. Planning anesthesia consult closer to delivery   Reviewed fetal kick counts, encouraged to complete BID  Reviewed S&S PTL  Discussed proper hydration and encouraged to increase PO hydration  Reviewed this stage of pregnancy  Problem list updated     Follow up in 1 week(s) with Dr. Miranda    I spent 23 minutes caring for Rosalina on this date of service. This time includes time spent by me in the following activities: preparing for the visit, reviewing tests, obtaining and/or reviewing a separately obtained history, performing a medically appropriate examination and/or evaluation, counseling and educating the patient/family/caregiver, ordering medications, tests, or procedures, referring and communicating with other health care professionals, and documenting information in the medical record    Bailey Mercer, APRN  2023  14:47 EDT

## 2023-06-16 ENCOUNTER — ROUTINE PRENATAL (OUTPATIENT)
Dept: OBSTETRICS AND GYNECOLOGY | Facility: CLINIC | Age: 23
End: 2023-06-16
Payer: COMMERCIAL

## 2023-06-16 VITALS — BODY MASS INDEX: 37.08 KG/M2 | DIASTOLIC BLOOD PRESSURE: 83 MMHG | SYSTOLIC BLOOD PRESSURE: 132 MMHG | WEIGHT: 222.8 LBS

## 2023-06-16 DIAGNOSIS — O41.93X0 AMNIOTIC FLUID ABNORMAL, THIRD TRIMESTER, NOT APPLICABLE OR UNSPECIFIED FETUS: Primary | ICD-10-CM

## 2023-06-16 LAB
GLUCOSE UR STRIP-MCNC: NEGATIVE MG/DL
PROT UR STRIP-MCNC: NEGATIVE MG/DL

## 2023-06-16 NOTE — PROGRESS NOTES
Chief Complaint   Patient presents with    Routine Prenatal Visit     Pt here for routine prenatal visit.   32w5d  U/S today, low fluid.       Rosalina Pardo is a 23 y.o.  at 32w5d  here for routine OB visit  Reports that she has had discharge - sometimes more liquid- since the end of the second trimester.  Some BH contractions, maybe one ever other day    /83   Wt 101 kg (222 lb 12.8 oz)   LMP  (LMP Unknown)   BMI 37.08 kg/m²    Gen: NAD, well appearing  Abd: nontender  Pelvic: normal external genitalia, pool/valsalva negative, Nitrazine negative, fern negative    See OB Flowsheet    ASSESSMENT/PLAN:  (O41.93X0) Amniotic fluid abnormal, third trimester, not applicable or unspecified fetus - Plan: POC Urinalysis Dipstick    ROXANNE did improve to 9cm and she has at least one 2cm pocket  No evidence of ROM on exam. Reviewed limitations of nitrazine and fern, encouraged to go to OB triage with gush, more liquid discharge, concern for  labor. She agrees  Follow up with Dr. Miranda next week with ultrasound

## 2023-07-05 PROBLEM — Z34.90 PREGNANCY: Status: ACTIVE | Noted: 2023-07-05

## 2023-07-06 PROBLEM — Z34.90 PREGNANCY: Status: RESOLVED | Noted: 2023-07-05 | Resolved: 2023-07-06

## 2023-07-17 ENCOUNTER — TELEPHONE (OUTPATIENT)
Dept: OBSTETRICS AND GYNECOLOGY | Facility: CLINIC | Age: 23
End: 2023-07-17

## 2023-07-18 ENCOUNTER — HOSPITAL ENCOUNTER (INPATIENT)
Facility: HOSPITAL | Age: 23
LOS: 4 days | Discharge: HOME | End: 2023-07-22
Attending: OBSTETRICS & GYNECOLOGY | Admitting: OBSTETRICS & GYNECOLOGY
Payer: COMMERCIAL

## 2023-07-18 DIAGNOSIS — Z98.891 STATUS POST CESAREAN DELIVERY: Primary | ICD-10-CM

## 2023-07-18 DIAGNOSIS — O41.93X0 AMNIOTIC FLUID ABNORMAL, THIRD TRIMESTER, NOT APPLICABLE OR UNSPECIFIED FETUS: ICD-10-CM

## 2023-07-18 PROBLEM — O28.8 AFI (AMNIOTIC FLUID INDEX) DECREASED: Status: ACTIVE | Noted: 2023-07-18

## 2023-07-18 LAB
ABO GROUP BLD: NORMAL
BLD GP AB SCN SERPL QL: NEGATIVE
DEPRECATED RDW RBC AUTO: 40.1 FL (ref 37–54)
ERYTHROCYTE [DISTWIDTH] IN BLOOD BY AUTOMATED COUNT: 12.2 % (ref 12.3–15.4)
HCT VFR BLD AUTO: 32.5 % (ref 34–46.6)
HGB BLD-MCNC: 11.2 G/DL (ref 12–15.9)
MCH RBC QN AUTO: 30.9 PG (ref 26.6–33)
MCHC RBC AUTO-ENTMCNC: 34.5 G/DL (ref 31.5–35.7)
MCV RBC AUTO: 89.8 FL (ref 79–97)
PLATELET # BLD AUTO: 359 10*3/MM3 (ref 140–450)
PMV BLD AUTO: 10 FL (ref 6–12)
RBC # BLD AUTO: 3.62 10*6/MM3 (ref 3.77–5.28)
RH BLD: POSITIVE
T&S EXPIRATION DATE: NORMAL
WBC NRBC COR # BLD: 13.27 10*3/MM3 (ref 3.4–10.8)

## 2023-07-18 PROCEDURE — 85027 COMPLETE CBC AUTOMATED: CPT | Performed by: OBSTETRICS & GYNECOLOGY

## 2023-07-18 PROCEDURE — 86901 BLOOD TYPING SEROLOGIC RH(D): CPT | Performed by: OBSTETRICS & GYNECOLOGY

## 2023-07-18 PROCEDURE — 86900 BLOOD TYPING SEROLOGIC ABO: CPT | Performed by: OBSTETRICS & GYNECOLOGY

## 2023-07-18 PROCEDURE — 86850 RBC ANTIBODY SCREEN: CPT | Performed by: OBSTETRICS & GYNECOLOGY

## 2023-07-18 RX ORDER — SODIUM CHLORIDE 0.9 % (FLUSH) 0.9 %
10 SYRINGE (ML) INJECTION AS NEEDED
Status: DISCONTINUED | OUTPATIENT
Start: 2023-07-18 | End: 2023-07-20 | Stop reason: HOSPADM

## 2023-07-18 RX ORDER — ONDANSETRON 4 MG/1
4 TABLET, FILM COATED ORAL EVERY 6 HOURS PRN
Status: DISCONTINUED | OUTPATIENT
Start: 2023-07-18 | End: 2023-07-20 | Stop reason: HOSPADM

## 2023-07-18 RX ORDER — DIPHENHYDRAMINE HYDROCHLORIDE 50 MG/ML
12.5 INJECTION INTRAMUSCULAR; INTRAVENOUS EVERY 8 HOURS PRN
Status: DISCONTINUED | OUTPATIENT
Start: 2023-07-18 | End: 2023-07-20 | Stop reason: HOSPADM

## 2023-07-18 RX ORDER — FENTANYL CIT 0.2 MG/100ML-ROPIV 0.2%-NACL 0.9% EPIDURAL INJ 2/0.2 MCG/ML-%
10 SOLUTION INJECTION CONTINUOUS
Status: DISCONTINUED | OUTPATIENT
Start: 2023-07-18 | End: 2023-07-20

## 2023-07-18 RX ORDER — EPHEDRINE SULFATE 50 MG/ML
5 INJECTION, SOLUTION INTRAVENOUS
Status: DISCONTINUED | OUTPATIENT
Start: 2023-07-18 | End: 2023-07-20 | Stop reason: HOSPADM

## 2023-07-18 RX ORDER — MISOPROSTOL 100 MCG
25 TABLET ORAL
Status: COMPLETED | OUTPATIENT
Start: 2023-07-18 | End: 2023-07-19

## 2023-07-18 RX ORDER — ONDANSETRON 2 MG/ML
4 INJECTION INTRAMUSCULAR; INTRAVENOUS ONCE AS NEEDED
Status: DISCONTINUED | OUTPATIENT
Start: 2023-07-18 | End: 2023-07-20 | Stop reason: HOSPADM

## 2023-07-18 RX ORDER — SODIUM CHLORIDE 0.9 % (FLUSH) 0.9 %
10 SYRINGE (ML) INJECTION EVERY 12 HOURS SCHEDULED
Status: DISCONTINUED | OUTPATIENT
Start: 2023-07-18 | End: 2023-07-20 | Stop reason: HOSPADM

## 2023-07-18 RX ORDER — ACETAMINOPHEN 325 MG/1
650 TABLET ORAL EVERY 4 HOURS PRN
Status: DISCONTINUED | OUTPATIENT
Start: 2023-07-18 | End: 2023-07-20 | Stop reason: HOSPADM

## 2023-07-18 RX ORDER — MAGNESIUM CARB/ALUMINUM HYDROX 105-160MG
30 TABLET,CHEWABLE ORAL ONCE
Status: DISCONTINUED | OUTPATIENT
Start: 2023-07-18 | End: 2023-07-20 | Stop reason: HOSPADM

## 2023-07-18 RX ORDER — LIDOCAINE HYDROCHLORIDE 10 MG/ML
5 INJECTION, SOLUTION EPIDURAL; INFILTRATION; INTRACAUDAL; PERINEURAL AS NEEDED
Status: DISCONTINUED | OUTPATIENT
Start: 2023-07-18 | End: 2023-07-20 | Stop reason: HOSPADM

## 2023-07-18 RX ORDER — CALCIUM CARBONATE 500 MG/1
2 TABLET, CHEWABLE ORAL DAILY
COMMUNITY
End: 2023-07-22 | Stop reason: HOSPADM

## 2023-07-18 RX ORDER — FAMOTIDINE 10 MG/ML
20 INJECTION, SOLUTION INTRAVENOUS ONCE AS NEEDED
Status: COMPLETED | OUTPATIENT
Start: 2023-07-18 | End: 2023-07-20

## 2023-07-18 RX ORDER — ONDANSETRON 2 MG/ML
4 INJECTION INTRAMUSCULAR; INTRAVENOUS EVERY 6 HOURS PRN
Status: DISCONTINUED | OUTPATIENT
Start: 2023-07-18 | End: 2023-07-20 | Stop reason: HOSPADM

## 2023-07-18 RX ORDER — SODIUM CHLORIDE, SODIUM LACTATE, POTASSIUM CHLORIDE, CALCIUM CHLORIDE 600; 310; 30; 20 MG/100ML; MG/100ML; MG/100ML; MG/100ML
125 INJECTION, SOLUTION INTRAVENOUS CONTINUOUS
Status: DISCONTINUED | OUTPATIENT
Start: 2023-07-18 | End: 2023-07-20

## 2023-07-18 RX ADMIN — Medication 25 MCG: at 18:28

## 2023-07-18 RX ADMIN — Medication 25 MCG: at 22:48

## 2023-07-18 NOTE — PLAN OF CARE
Problem: Adult Inpatient Plan of Care  Goal: Plan of Care Review  Outcome: Ongoing, Progressing  Goal: Patient-Specific Goal (Individualized)  Outcome: Ongoing, Progressing  Goal: Absence of Hospital-Acquired Illness or Injury  Outcome: Ongoing, Progressing  Goal: Optimal Comfort and Wellbeing  Outcome: Ongoing, Progressing  Goal: Readiness for Transition of Care  Outcome: Ongoing, Progressing     Problem: Bleeding (Labor)  Goal: Hemostasis  Outcome: Ongoing, Progressing     Problem: Change in Fetal Wellbeing (Labor)  Goal: Stable Fetal Wellbeing  Outcome: Ongoing, Progressing     Problem: Delayed Labor Progression (Labor)  Goal: Effective Progression to Delivery  Outcome: Ongoing, Progressing     Problem: Infection (Labor)  Goal: Absence of Infection Signs and Symptoms  Outcome: Ongoing, Progressing     Problem: Labor Pain (Labor)  Goal: Acceptable Pain Control  Outcome: Ongoing, Progressing     Problem: Uterine Tachysystole (Labor)  Goal: Normal Uterine Contraction Pattern  Outcome: Ongoing, Progressing     Problem: Pain Acute  Goal: Acceptable Pain Control and Functional Ability  Outcome: Ongoing, Progressing   Goal Outcome Evaluation:

## 2023-07-19 RX ORDER — OXYTOCIN/0.9 % SODIUM CHLORIDE 30/500 ML
2-20 PLASTIC BAG, INJECTION (ML) INTRAVENOUS
Status: DISCONTINUED | OUTPATIENT
Start: 2023-07-19 | End: 2023-07-20

## 2023-07-19 RX ADMIN — SODIUM CHLORIDE, POTASSIUM CHLORIDE, SODIUM LACTATE AND CALCIUM CHLORIDE 125 ML/HR: 600; 310; 30; 20 INJECTION, SOLUTION INTRAVENOUS at 15:30

## 2023-07-19 RX ADMIN — Medication 25 MCG: at 02:46

## 2023-07-19 RX ADMIN — SODIUM CHLORIDE, POTASSIUM CHLORIDE, SODIUM LACTATE AND CALCIUM CHLORIDE 125 ML/HR: 600; 310; 30; 20 INJECTION, SOLUTION INTRAVENOUS at 08:14

## 2023-07-19 RX ADMIN — Medication 2 MILLI-UNITS/MIN: at 08:50

## 2023-07-19 RX ADMIN — ACETAMINOPHEN 650 MG: 325 TABLET, FILM COATED ORAL at 02:46

## 2023-07-19 NOTE — PROGRESS NOTES
Patient received 3 doses of Cytotec overnight followed by Pitocin all day.  She has made minimal cervical change to half a centimeter.  I was unable to reach her internal os on exam and explained to her that I do not feel I would be able to get a Cook catheter in her cervix for cervical ripening.  I discussed different options going forward.  I discussed stopping the Pitocin and trying Cervidil overnight and I also gave her the option of proceeding with a  section if she desired, but felt that she could benefit from more cervical ripening.  She would like to do Cervidil overnight.  We will stop her Pitocin and she may eat and shower.

## 2023-07-19 NOTE — PLAN OF CARE
Goal Outcome Evaluation:  Plan of Care Reviewed With: patient        Progress: improving  Outcome Evaluation: Term IOL for oligohydraminos with cytotec. Pt recieved 3 doses of cytotec through the night and has orders to start pitocin in the morning. Anticipate vaginal delivery.

## 2023-07-19 NOTE — H&P
"Patient is a 23 y.o. female  admitted at 37+ weeks for history of \"low ROXANNE.\"  This plan is based on MFM recommendation.  Patient had initial ROXANNE in office last week of 4, which meets criteria for oligohydramnios.  Repeat imaging was ROXANNE of 8 cm.  Prenatal care otherwise uncomplicated.    Patient Active Problem List   Diagnosis    Chiari I malformation    Chronic bilateral low back pain without sciatica    Tension headache    ROXANNE (amniotic fluid index) decreased     Prenatal care is complicated by history of oligohydramnios.    Past Medical History:   Diagnosis Date    ADHD (attention deficit hyperactivity disorder)     Chiari I malformation     Scoliosis        Past Surgical History:   Procedure Laterality Date    MOUTH SURGERY      TONSILLECTOMY      WISDOM TOOTH EXTRACTION         No Known Allergies    Social History     Socioeconomic History    Marital status:     Number of children: 0    Years of education: STUDENT    Highest education level: Not asked   Tobacco Use    Smoking status: Never    Smokeless tobacco: Never   Vaping Use    Vaping Use: Former   Substance and Sexual Activity    Alcohol use: No    Drug use: No    Sexual activity: Defer       Physical Exam  Gen: Alert and pleasant.  Vitals:    23 0931   BP: 125/74   Pulse: 90   Resp:    Temp:    SpO2:      HEENT: WNL  Abdomen: EFW 6lbs  Cervix:  20/ft/-3.  Pelvis adequate clinically.  FHT: Category 1    Assessment/Plan: IUP at 37 weeks with history of oligohydramnios, now with borderline low ROXANNE.  - MFM recommended delivery at 37 weeks.  - Patient received misoprostol.  Her cervix is soft and slightly open.  I did not feel patient was candidate for cervical balloon ripening at this point.  Will start pitocin.  - Fetal status reassuring.      Jm Tinajero MD  "

## 2023-07-20 PROCEDURE — 25010000002 CEFAZOLIN IN DEXTROSE 2-4 GM/100ML-% SOLUTION: Performed by: OBSTETRICS & GYNECOLOGY

## 2023-07-20 PROCEDURE — 59510 CESAREAN DELIVERY: CPT | Performed by: OBSTETRICS & GYNECOLOGY

## 2023-07-20 PROCEDURE — 3E0DXGC INTRODUCTION OF OTHER THERAPEUTIC SUBSTANCE INTO MOUTH AND PHARYNX, EXTERNAL APPROACH: ICD-10-PCS | Performed by: OBSTETRICS & GYNECOLOGY

## 2023-07-20 PROCEDURE — 3E0P7VZ INTRODUCTION OF HORMONE INTO FEMALE REPRODUCTIVE, VIA NATURAL OR ARTIFICIAL OPENING: ICD-10-PCS | Performed by: OBSTETRICS & GYNECOLOGY

## 2023-07-20 PROCEDURE — 25010000002 ONDANSETRON PER 1 MG: Performed by: OBSTETRICS & GYNECOLOGY

## 2023-07-20 PROCEDURE — 25010000002 AZITHROMYCIN PER 500 MG: Performed by: OBSTETRICS & GYNECOLOGY

## 2023-07-20 PROCEDURE — 88307 TISSUE EXAM BY PATHOLOGIST: CPT

## 2023-07-20 PROCEDURE — 25010000002 KETOROLAC TROMETHAMINE PER 15 MG: Performed by: OBSTETRICS & GYNECOLOGY

## 2023-07-20 PROCEDURE — 3E033VJ INTRODUCTION OF OTHER HORMONE INTO PERIPHERAL VEIN, PERCUTANEOUS APPROACH: ICD-10-PCS | Performed by: OBSTETRICS & GYNECOLOGY

## 2023-07-20 RX ORDER — CEFAZOLIN SODIUM 2 G/100ML
2 INJECTION, SOLUTION INTRAVENOUS ONCE
Status: COMPLETED | OUTPATIENT
Start: 2023-07-20 | End: 2023-07-20

## 2023-07-20 RX ORDER — ONDANSETRON 2 MG/ML
4 INJECTION INTRAMUSCULAR; INTRAVENOUS EVERY 6 HOURS PRN
Status: DISCONTINUED | OUTPATIENT
Start: 2023-07-20 | End: 2023-07-22 | Stop reason: HOSPADM

## 2023-07-20 RX ORDER — KETOROLAC TROMETHAMINE 30 MG/ML
30 INJECTION, SOLUTION INTRAMUSCULAR; INTRAVENOUS ONCE
Status: DISCONTINUED | OUTPATIENT
Start: 2023-07-20 | End: 2023-07-22 | Stop reason: HOSPADM

## 2023-07-20 RX ORDER — PROMETHAZINE HYDROCHLORIDE 12.5 MG/1
12.5 SUPPOSITORY RECTAL EVERY 6 HOURS PRN
Status: DISCONTINUED | OUTPATIENT
Start: 2023-07-20 | End: 2023-07-22 | Stop reason: HOSPADM

## 2023-07-20 RX ORDER — HYDROCORTISONE 25 MG/G
CREAM TOPICAL 3 TIMES DAILY PRN
Status: DISCONTINUED | OUTPATIENT
Start: 2023-07-20 | End: 2023-07-22 | Stop reason: HOSPADM

## 2023-07-20 RX ORDER — METHYLERGONOVINE MALEATE 0.2 MG/ML
200 INJECTION INTRAVENOUS ONCE AS NEEDED
Status: DISCONTINUED | OUTPATIENT
Start: 2023-07-20 | End: 2023-07-20 | Stop reason: HOSPADM

## 2023-07-20 RX ORDER — SIMETHICONE 80 MG
80 TABLET,CHEWABLE ORAL 4 TIMES DAILY PRN
Status: DISCONTINUED | OUTPATIENT
Start: 2023-07-20 | End: 2023-07-22 | Stop reason: HOSPADM

## 2023-07-20 RX ORDER — DROPERIDOL 2.5 MG/ML
0.62 INJECTION, SOLUTION INTRAMUSCULAR; INTRAVENOUS
Status: DISCONTINUED | OUTPATIENT
Start: 2023-07-20 | End: 2023-07-22 | Stop reason: HOSPADM

## 2023-07-20 RX ORDER — ONDANSETRON 2 MG/ML
4 INJECTION INTRAMUSCULAR; INTRAVENOUS ONCE AS NEEDED
Status: COMPLETED | OUTPATIENT
Start: 2023-07-20 | End: 2023-07-20

## 2023-07-20 RX ORDER — MORPHINE SULFATE 2 MG/ML
2 INJECTION, SOLUTION INTRAMUSCULAR; INTRAVENOUS
Status: ACTIVE | OUTPATIENT
Start: 2023-07-20 | End: 2023-07-20

## 2023-07-20 RX ORDER — ONDANSETRON 4 MG/1
4 TABLET, FILM COATED ORAL EVERY 6 HOURS PRN
Status: DISCONTINUED | OUTPATIENT
Start: 2023-07-20 | End: 2023-07-22 | Stop reason: HOSPADM

## 2023-07-20 RX ORDER — HYDROMORPHONE HYDROCHLORIDE 1 MG/ML
0.5 INJECTION, SOLUTION INTRAMUSCULAR; INTRAVENOUS; SUBCUTANEOUS
Status: ACTIVE | OUTPATIENT
Start: 2023-07-20 | End: 2023-07-20

## 2023-07-20 RX ORDER — NALOXONE HCL 0.4 MG/ML
0.2 VIAL (ML) INJECTION
Status: DISCONTINUED | OUTPATIENT
Start: 2023-07-20 | End: 2023-07-22 | Stop reason: HOSPADM

## 2023-07-20 RX ORDER — DIPHENHYDRAMINE HYDROCHLORIDE 50 MG/ML
25 INJECTION INTRAMUSCULAR; INTRAVENOUS EVERY 4 HOURS PRN
Status: DISCONTINUED | OUTPATIENT
Start: 2023-07-20 | End: 2023-07-22 | Stop reason: HOSPADM

## 2023-07-20 RX ORDER — ONDANSETRON 2 MG/ML
4 INJECTION INTRAMUSCULAR; INTRAVENOUS ONCE AS NEEDED
Status: DISCONTINUED | OUTPATIENT
Start: 2023-07-20 | End: 2023-07-22 | Stop reason: HOSPADM

## 2023-07-20 RX ORDER — IBUPROFEN 600 MG/1
600 TABLET ORAL EVERY 6 HOURS
Status: DISCONTINUED | OUTPATIENT
Start: 2023-07-21 | End: 2023-07-22 | Stop reason: HOSPADM

## 2023-07-20 RX ORDER — CARBOPROST TROMETHAMINE 250 UG/ML
250 INJECTION, SOLUTION INTRAMUSCULAR
Status: DISCONTINUED | OUTPATIENT
Start: 2023-07-20 | End: 2023-07-20 | Stop reason: HOSPADM

## 2023-07-20 RX ORDER — DIPHENHYDRAMINE HCL 25 MG
25 CAPSULE ORAL EVERY 4 HOURS PRN
Status: DISCONTINUED | OUTPATIENT
Start: 2023-07-20 | End: 2023-07-22 | Stop reason: HOSPADM

## 2023-07-20 RX ORDER — ACETAMINOPHEN 500 MG
1000 TABLET ORAL EVERY 6 HOURS
Status: COMPLETED | OUTPATIENT
Start: 2023-07-20 | End: 2023-07-21

## 2023-07-20 RX ORDER — MISOPROSTOL 200 UG/1
800 TABLET ORAL ONCE AS NEEDED
Status: DISCONTINUED | OUTPATIENT
Start: 2023-07-20 | End: 2023-07-20 | Stop reason: HOSPADM

## 2023-07-20 RX ORDER — OXYTOCIN/0.9 % SODIUM CHLORIDE 30/500 ML
999 PLASTIC BAG, INJECTION (ML) INTRAVENOUS ONCE
Status: DISCONTINUED | OUTPATIENT
Start: 2023-07-20 | End: 2023-07-20 | Stop reason: HOSPADM

## 2023-07-20 RX ORDER — OXYTOCIN/0.9 % SODIUM CHLORIDE 30/500 ML
250 PLASTIC BAG, INJECTION (ML) INTRAVENOUS CONTINUOUS
Status: DISCONTINUED | OUTPATIENT
Start: 2023-07-20 | End: 2023-07-20

## 2023-07-20 RX ORDER — FAMOTIDINE 10 MG/ML
20 INJECTION, SOLUTION INTRAVENOUS ONCE AS NEEDED
Status: DISCONTINUED | OUTPATIENT
Start: 2023-07-20 | End: 2023-07-20 | Stop reason: HOSPADM

## 2023-07-20 RX ORDER — CEFAZOLIN SODIUM 2 G/100ML
2 INJECTION, SOLUTION INTRAVENOUS ONCE
Status: DISCONTINUED | OUTPATIENT
Start: 2023-07-20 | End: 2023-07-20

## 2023-07-20 RX ORDER — HYDROXYZINE 50 MG/1
50 TABLET, FILM COATED ORAL EVERY 6 HOURS PRN
Status: DISCONTINUED | OUTPATIENT
Start: 2023-07-20 | End: 2023-07-22 | Stop reason: HOSPADM

## 2023-07-20 RX ORDER — KETOROLAC TROMETHAMINE 15 MG/ML
15 INJECTION, SOLUTION INTRAMUSCULAR; INTRAVENOUS EVERY 6 HOURS
Status: COMPLETED | OUTPATIENT
Start: 2023-07-20 | End: 2023-07-21

## 2023-07-20 RX ORDER — ACETAMINOPHEN 500 MG
1000 TABLET ORAL ONCE
Status: DISCONTINUED | OUTPATIENT
Start: 2023-07-20 | End: 2023-07-20

## 2023-07-20 RX ORDER — OXYCODONE HYDROCHLORIDE 5 MG/1
5 TABLET ORAL EVERY 4 HOURS PRN
Status: DISCONTINUED | OUTPATIENT
Start: 2023-07-20 | End: 2023-07-22 | Stop reason: HOSPADM

## 2023-07-20 RX ORDER — ACETAMINOPHEN 325 MG/1
650 TABLET ORAL EVERY 6 HOURS
Status: DISCONTINUED | OUTPATIENT
Start: 2023-07-21 | End: 2023-07-22 | Stop reason: HOSPADM

## 2023-07-20 RX ORDER — OXYTOCIN/0.9 % SODIUM CHLORIDE 30/500 ML
125 PLASTIC BAG, INJECTION (ML) INTRAVENOUS CONTINUOUS PRN
Status: COMPLETED | OUTPATIENT
Start: 2023-07-20 | End: 2023-07-20

## 2023-07-20 RX ORDER — OXYCODONE HYDROCHLORIDE 10 MG/1
10 TABLET ORAL EVERY 4 HOURS PRN
Status: DISCONTINUED | OUTPATIENT
Start: 2023-07-20 | End: 2023-07-22 | Stop reason: HOSPADM

## 2023-07-20 RX ORDER — PROMETHAZINE HYDROCHLORIDE 25 MG/1
25 TABLET ORAL EVERY 6 HOURS PRN
Status: DISCONTINUED | OUTPATIENT
Start: 2023-07-20 | End: 2023-07-22 | Stop reason: HOSPADM

## 2023-07-20 RX ORDER — ACETAMINOPHEN 500 MG
1000 TABLET ORAL ONCE
Status: COMPLETED | OUTPATIENT
Start: 2023-07-20 | End: 2023-07-20

## 2023-07-20 RX ADMIN — ONDANSETRON 4 MG: 2 INJECTION INTRAMUSCULAR; INTRAVENOUS at 12:24

## 2023-07-20 RX ADMIN — KETOROLAC TROMETHAMINE 15 MG: 15 INJECTION, SOLUTION INTRAMUSCULAR; INTRAVENOUS at 21:37

## 2023-07-20 RX ADMIN — SODIUM CHLORIDE, POTASSIUM CHLORIDE, SODIUM LACTATE AND CALCIUM CHLORIDE 1000 ML: 600; 310; 30; 20 INJECTION, SOLUTION INTRAVENOUS at 12:17

## 2023-07-20 RX ADMIN — ACETAMINOPHEN 1000 MG: 500 TABLET ORAL at 10:52

## 2023-07-20 RX ADMIN — Medication 125 ML/HR: at 15:26

## 2023-07-20 RX ADMIN — CEFAZOLIN SODIUM 2 G: 2 INJECTION, SOLUTION INTRAVENOUS at 12:24

## 2023-07-20 RX ADMIN — FAMOTIDINE 20 MG: 10 INJECTION INTRAVENOUS at 12:24

## 2023-07-20 RX ADMIN — AZITHROMYCIN MONOHYDRATE 500 MG: 500 INJECTION, POWDER, LYOPHILIZED, FOR SOLUTION INTRAVENOUS at 12:25

## 2023-07-20 RX ADMIN — AZITHROMYCIN MONOHYDRATE 500 MG: 500 INJECTION, POWDER, LYOPHILIZED, FOR SOLUTION INTRAVENOUS at 13:10

## 2023-07-20 RX ADMIN — ACETAMINOPHEN 1000 MG: 500 TABLET ORAL at 18:49

## 2023-07-20 NOTE — OP NOTE
Operative Report: Primary Low Transverse  Section    Patient Name: Rosalina Pardo    MRN: [id number]    Date of Surgery: 23     Pre-op Diagnoses:   1. Intrauterine Pregnancy at 37w4d  2. IOL for low ROXANNE  3. Failed induction, prolonged first stage of labor      Post op Diagnoses: same    Procedure: Primary low transverse  section via pfannenstiel incision    Surgeon: Enriqueta Stokes MD     Assistant:  Assistant: Felipe Gonsales MD was responsible for performing the following activities: Retraction, Suction, Irrigation, and Delivery of Fetus and their skilled assistance was necessary for the success of this case.       Anesthesia: Spinal    QBL:   /       Specimen: Placenta to pathology    Complication: none immediately noted    Findings: normal uterus, tubes and ovaries, no adhesive disease    Infant Details: Female infant in vertex presentation, weight 3380g, apgars 8/9,     Indication and Consent  Rosalina Pardo is a 23 y.o.  at 37w4d who presented with plan for IOL for borderline ROXANNE. She was given misoprostol, pitocin and cervidil and remained one centimeter. After discussion of options she desired a primary  section.  The patient understood that the risks of  section include, but are not limited to, visceral or vascular injury, infection, blood loss and need for transfusion, prolonged hospitalization and reoperation. The patient stated understanding and desired to proceed. All questions were answered.    Procedure:  The patient was taken to the operating room where spinal anesthesia was administered and found to be adequate. Antibiotics were given for infection prophylaxis. She was prepped and draped in the dorsal supine position with a leftward tilt. Timeout was performed.  A Pfannenstiel skin incision was made with the scalpel. The incision was carried down to the fascia sharply. The superior aspect of the fascia was grasped with Kocher clamps and the underlying rectus  muscle was dissected off sharply with gonzalez scissors. In a similar fashion, the inferior aspect of the fascia was grasped with kocher clamps and the rectus muscle and pyramidalis muscle were dissected off with gonzalez scissors. The rectus muscle was  in the midline down to the level of the pubic symphysis. Preperitoneal fatty tissue was bluntly dissected to expose the peritoneum. The peritoneum was found to be free of adherent bowel and entered bluntly . The peritoneal incision was extended superiorly and inferiorly to the bladder reflection with good visualization of the bladder.    The bladder blade was inserted and vesicouterine peritoneum identified. Intraabdominal survey revealed scant, clear peritoneal fluid and the thinned out lower uterine segment. The vesicouterine peritoneum was opened with scissors and the bladder flap was developed. The bladder blade was repositioned to keep the bladder out of the operative field. The lower uterine segment was incised with a scalpel. The amniotic sac was ruptured  and clear fluid was noted. The uterine incision was extended bluntly with upward traction.    The fetus was in cephalic position. The head was elevated out of the pelvis with special attention paid to avoid using the uterine incision as a fulcrum. Gentle fundal pressure was applied once the head was brought into the incision. The infant was delivered with no difficulty. The mouth and nose were suctioned with a bulb. The cord was clamped and cut. The infant was handed to the nursery staff. IV oxytocin was initiated to facilitate uterine contractions. The placenta was delivered intact with manual massage of the uterine fundus. The uterus was then exteriorized. The inside of the uterus was gently wiped with a lap sponge to assure complete placental membrane removal. The uterine incision was closed with 0-0 Monocryl in a running locked fashion. A second imbricating layer was performed. Hemostasis was noted.  The ovaries and tubes were found to be normal. The uterus, tubes and ovaries were then returned to the abdominal cavity. The blood clots and fluid were wiped out of the abdomen and pelvis with moist laparotomy sponges. The uterine incision was reinspected and good hemostasis was noted.   The rectus muscles and fascia were inspected and noted to be hemostatic.  The fascial layer was closed with 0-0 Vicryl suture. 3-0 Vicryl was used to reapproximate the subcutaneous tissues. The skin was closed with 3-0 Monocryl in a subcuticular fashion. The patient tolerated the procedure well. All the counts were correct times two. The patient was taken to the recovery room in stable condition.        Enriqueta Sotkes MD

## 2023-07-20 NOTE — LACTATION NOTE
"This note was copied from a baby's chart.  P1T - new admission.  Mom has baby latched now to her right breast in football hold.  Even though baby is swaddled she is in good position &  has nutritive suckle with deep jaw rotations.  Mom reports she & baby are \"doing very well with breastfeeding\" & she states this is her 3rd feeding since delivery.  She says latch is comfortable & denies pain or pinching.  She has a [employer] personal breast pump & says she used it for 1 day about a week.  She expressed about 2oz of colostrum.  Explained that hand expression is better than using an electric pump & that pumping early on can lead to hyperlactation which has it's own problems.    Education provided:  feeding cues; frequency/duration; rousing sleepy baby; diaper expectations; milk production in relation to infant’s small stomach size; drops of colostrum being normal the first few days progressing to increasing amounts of milk & eventually full supply 3-5 days after delivery; positioning at breast; signs of good latch; breast massage & hand expression.  Referred to education in book at bedside, specifically pg 42 & the QR code that links to a video on breast massage & hand expression.  Verbalized understanding.     Mother denies questions/concerns at this time.  Encouraged to call for help when needed.  TAVARES # on WB.     "

## 2023-07-20 NOTE — NURSING NOTE
Cervidil was removed at 725. Cervix minimally changed, now /. RN provided patient with information regarding options for next steps for induction (ie: Cook catheter, pitocin). Patient voices desire to have a  delivery instead of continuing with induction process. RN will notify Dr. Stokes.

## 2023-07-20 NOTE — PROGRESS NOTES
Labor Progress Note    SUBJECTIVE:   Patient is comfortable, does feel occasional contractions    OBJECTIVE:   Vitals:    23 0129 23 0629 23 0725 23 0830   BP:  126/75 115/78    Pulse:  99 89    Resp:   18    Temp: 98.1 °F (36.7 °C)   97.7 °F (36.5 °C)   TempSrc: Oral   Oral   SpO2:       Height:          Physical Examination:   General appearance - alert, well appearing, and in no distress  Chest - no tachypnea, retractions or cyanosis   Abdomen - gravid, nontender  Pelvic - normal external genitalia, cervix unchanged from prior exam    FHT  normal baseline, mod variability, + accelerations, no decelerations  Inglis: irregular contractions    ASSESSMENT:   23 y.o.  at 37w4d here for IOL for borderline ROXANNE    PLAN:    Labor:   - s/p 3 doses of cytotec, pitocin, and then cervidil overnight with minimal change. She was offered a cook catheter, but declines.    After consideration, she desires a primary  section. She was counseled on risks/benefits.      Pain control - adequate at this time.  As plan is to proceed with CS we will plan for spinal and she is in agreement    Fetal status Reassuring    GBS Negative    Reviewed with patient and given reassuring fetal status, we will plan for a  section at earliest availability of operating room, likely in early afternoon. She is agreeable to this. Plan to do NST q hour and if reassuring okay to come off for the rest of the hour.

## 2023-07-21 LAB
BASOPHILS # BLD AUTO: 0.07 10*3/MM3 (ref 0–0.2)
BASOPHILS NFR BLD AUTO: 0.7 % (ref 0–1.5)
DEPRECATED RDW RBC AUTO: 39.2 FL (ref 37–54)
EOSINOPHIL # BLD AUTO: 0.11 10*3/MM3 (ref 0–0.4)
EOSINOPHIL NFR BLD AUTO: 1.1 % (ref 0.3–6.2)
ERYTHROCYTE [DISTWIDTH] IN BLOOD BY AUTOMATED COUNT: 12 % (ref 12.3–15.4)
HCT VFR BLD AUTO: 28.8 % (ref 34–46.6)
HGB BLD-MCNC: 9.5 G/DL (ref 12–15.9)
IMM GRANULOCYTES # BLD AUTO: 0.04 10*3/MM3 (ref 0–0.05)
IMM GRANULOCYTES NFR BLD AUTO: 0.4 % (ref 0–0.5)
LYMPHOCYTES # BLD AUTO: 1.46 10*3/MM3 (ref 0.7–3.1)
LYMPHOCYTES NFR BLD AUTO: 14.5 % (ref 19.6–45.3)
MCH RBC QN AUTO: 29.8 PG (ref 26.6–33)
MCHC RBC AUTO-ENTMCNC: 33 G/DL (ref 31.5–35.7)
MCV RBC AUTO: 90.3 FL (ref 79–97)
MONOCYTES # BLD AUTO: 1.02 10*3/MM3 (ref 0.1–0.9)
MONOCYTES NFR BLD AUTO: 10.1 % (ref 5–12)
NEUTROPHILS NFR BLD AUTO: 7.39 10*3/MM3 (ref 1.7–7)
NEUTROPHILS NFR BLD AUTO: 73.2 % (ref 42.7–76)
NRBC BLD AUTO-RTO: 0 /100 WBC (ref 0–0.2)
PLATELET # BLD AUTO: 291 10*3/MM3 (ref 140–450)
PMV BLD AUTO: 10.1 FL (ref 6–12)
RBC # BLD AUTO: 3.19 10*6/MM3 (ref 3.77–5.28)
WBC NRBC COR # BLD: 10.09 10*3/MM3 (ref 3.4–10.8)

## 2023-07-21 PROCEDURE — 85025 COMPLETE CBC W/AUTO DIFF WBC: CPT | Performed by: OBSTETRICS & GYNECOLOGY

## 2023-07-21 PROCEDURE — 25010000002 ENOXAPARIN PER 10 MG: Performed by: OBSTETRICS & GYNECOLOGY

## 2023-07-21 PROCEDURE — 25010000002 KETOROLAC TROMETHAMINE PER 15 MG: Performed by: OBSTETRICS & GYNECOLOGY

## 2023-07-21 RX ORDER — ENOXAPARIN SODIUM 100 MG/ML
40 INJECTION SUBCUTANEOUS DAILY
Status: DISCONTINUED | OUTPATIENT
Start: 2023-07-21 | End: 2023-07-22 | Stop reason: HOSPADM

## 2023-07-21 RX ADMIN — ACETAMINOPHEN 1000 MG: 500 TABLET ORAL at 13:48

## 2023-07-21 RX ADMIN — KETOROLAC TROMETHAMINE 15 MG: 15 INJECTION, SOLUTION INTRAMUSCULAR; INTRAVENOUS at 16:58

## 2023-07-21 RX ADMIN — KETOROLAC TROMETHAMINE 15 MG: 15 INJECTION, SOLUTION INTRAMUSCULAR; INTRAVENOUS at 04:14

## 2023-07-21 RX ADMIN — ACETAMINOPHEN 1000 MG: 500 TABLET ORAL at 05:45

## 2023-07-21 RX ADMIN — ACETAMINOPHEN 650 MG: 325 TABLET, FILM COATED ORAL at 19:10

## 2023-07-21 RX ADMIN — KETOROLAC TROMETHAMINE 15 MG: 15 INJECTION, SOLUTION INTRAMUSCULAR; INTRAVENOUS at 10:15

## 2023-07-21 RX ADMIN — ACETAMINOPHEN 1000 MG: 500 TABLET ORAL at 00:02

## 2023-07-21 RX ADMIN — ENOXAPARIN SODIUM 40 MG: 100 INJECTION SUBCUTANEOUS at 12:18

## 2023-07-21 RX ADMIN — IBUPROFEN 600 MG: 600 TABLET, FILM COATED ORAL at 22:17

## 2023-07-21 NOTE — PROGRESS NOTES
Section Progress Note    Assessment & Plan     Status post  section: Doing well postoperatively.   HgB 9.5 from 11.2, appropriate for intra-op blood loss  Continue current care.  Rh status: O positive  Rubella: Immune  Gender: Female    DVT PPx: Lovenox ordered    Subjective     Postpartum Day 1:  Delivery    The patient feels well. The patient denies emotional concerns. Pain is well controlled with current medications. The baby iswell.Urinary output is adequate. The patient is ambulating well. The patient is tolerating a normal diet.     Objective     Vital signs in last 24 hours:  Temp:  [96.8 °F (36 °C)-98.2 °F (36.8 °C)] 97.8 °F (36.6 °C)  Heart Rate:  [] 83  Resp:  [16-18] 16  BP: ()/(45-77) 95/60      General:    alert, appears stated age, and cooperative   CV: Well perfused   Lungs:  no respiratory distress   Lochia:  appropriate   Uterine Fundus:   firm   Incision:  healing well, no significant drainage, no dehiscence, no significant erythema   DVT Evaluation:  No evidence of DVT seen on physical exam.     Lab Results   Component Value Date    WBC 10.09 2023    HGB 9.5 (L) 2023    HCT 28.8 (L) 2023    MCV 90.3 2023     2023         Enriqueta Stokes MD  2023  09:03 EDT

## 2023-07-21 NOTE — LACTATION NOTE
"This note was copied from a baby's chart.  Mom reports breast feeding is going \"great\", baby has had 2 wet and one stool so far today. Encouraged to call for any assistance or questions.  "

## 2023-07-22 VITALS
RESPIRATION RATE: 16 BRPM | OXYGEN SATURATION: 100 % | TEMPERATURE: 97.5 F | DIASTOLIC BLOOD PRESSURE: 82 MMHG | BODY MASS INDEX: 38.96 KG/M2 | HEART RATE: 87 BPM | SYSTOLIC BLOOD PRESSURE: 128 MMHG | HEIGHT: 64 IN

## 2023-07-22 PROBLEM — Z98.891 STATUS POST CESAREAN DELIVERY: Status: ACTIVE | Noted: 2023-07-22

## 2023-07-22 PROCEDURE — 0503F POSTPARTUM CARE VISIT: CPT | Performed by: OBSTETRICS & GYNECOLOGY

## 2023-07-22 PROCEDURE — 25010000002 ENOXAPARIN PER 10 MG: Performed by: OBSTETRICS & GYNECOLOGY

## 2023-07-22 RX ORDER — OXYCODONE HYDROCHLORIDE AND ACETAMINOPHEN 5; 325 MG/1; MG/1
1 TABLET ORAL EVERY 6 HOURS PRN
Qty: 12 TABLET | Refills: 0 | Status: SHIPPED | OUTPATIENT
Start: 2023-07-22 | End: 2023-07-25

## 2023-07-22 RX ORDER — IBUPROFEN 600 MG/1
600 TABLET ORAL EVERY 6 HOURS
Qty: 28 TABLET | Refills: 1 | Status: SHIPPED | OUTPATIENT
Start: 2023-07-22 | End: 2023-08-02

## 2023-07-22 RX ORDER — FERROUS SULFATE 325(65) MG
325 TABLET ORAL EVERY OTHER DAY
Qty: 15 TABLET | Refills: 1 | Status: SHIPPED | OUTPATIENT
Start: 2023-07-22 | End: 2023-08-02

## 2023-07-22 RX ADMIN — IBUPROFEN 600 MG: 600 TABLET, FILM COATED ORAL at 10:12

## 2023-07-22 RX ADMIN — IBUPROFEN 600 MG: 600 TABLET, FILM COATED ORAL at 04:06

## 2023-07-22 RX ADMIN — ACETAMINOPHEN 650 MG: 325 TABLET, FILM COATED ORAL at 07:53

## 2023-07-22 RX ADMIN — OXYCODONE HYDROCHLORIDE 5 MG: 5 TABLET ORAL at 05:17

## 2023-07-22 RX ADMIN — ACETAMINOPHEN 650 MG: 325 TABLET, FILM COATED ORAL at 13:31

## 2023-07-22 RX ADMIN — ACETAMINOPHEN 650 MG: 325 TABLET, FILM COATED ORAL at 00:43

## 2023-07-22 RX ADMIN — ENOXAPARIN SODIUM 40 MG: 100 INJECTION SUBCUTANEOUS at 10:12

## 2023-07-22 NOTE — DISCHARGE SUMMARY
[facility]         DISCHARGE SUMMARY    Patient Name: Rosalina Pardo  : 2000  MRN: [id number]    Date of Admission: 2023  Date of Discharge: 2023  Primary Care Physician: Emilee Lebron APRN    Consults       Date and Time Order Name Status Description    2023  7:07 PM Inpatient  Anesthesiology Physician Consult Completed     2023  2:59 PM Inpatient Maternal & Fetal Medicine Consult Completed             Presenting Problem:   ROXANNE (amniotic fluid index) decreased [O28.8]    Active and Resolved Hospital Problems:  Active Hospital Problems    Diagnosis POA    **Status post  delivery [Z98.891] Not Applicable    ROXANNE (amniotic fluid index) decreased [O28.8] Yes      Resolved Hospital Problems   No resolved problems to display.         Hospital Course     Hospital Course:  Rosalina Pardo is a 23 y.o. female presented for labor induction secondary to low amniotic fluid.  Her labor course was complicated by failure to progress in labor.  Ultimately  section was recommended.  Surgery was unremarkable.  See surgical note for full details.  By postoperative day #2, the patient was meeting all milestones for discharge, and the patient was requesting discharge.  Extensive discharge instructions given to the patient who verbalized understanding.  She will follow-up in the office in 2 weeks      Day of Discharge     Vital Signs:  Temp:  [97.5 °F (36.4 °C)-98.2 °F (36.8 °C)] 97.5 °F (36.4 °C)  Heart Rate:  [69-91] 87  Resp:  [16-18] 16  BP: (100-128)/(69-82) 128/82  Physical Exam: See progress notes      Pertinent  and/or Most Recent Results     LAB RESULTS:      Lab 23  0749 23   WBC 10.09 13.27*   HEMOGLOBIN 9.5* 11.2*   HEMATOCRIT 28.8* 32.5*   PLATELETS 291 359   NEUTROS ABS 7.39*  --    IMMATURE GRANS (ABS) 0.04  --    LYMPHS ABS 1.46  --    MONOS ABS 1.02*  --    EOS ABS 0.11  --    MCV 90.3 89.8                         Lab 23   ABO  TYPING O   RH TYPING Positive   ANTIBODY SCREEN Negative         Brief Urine Lab Results  (Last result in the past 365 days)        Color   Clarity   Blood   Leuk Est   Nitrite   Protein   CREAT   Urine HCG        23 1537           Trace                 Microbiology Results (last 10 days)       Procedure Component Value - Date/Time    Group B Streptococcus Culture - Swab, Vaginal/Rectum [[id number]] Collected: 23 1608    Lab Status: Final result Specimen: Swab from Vaginal/Rectum Updated: 23 1835     Strep Gp B Culture Negative     Comment: Centers for Disease Control and Prevention (CDC) and American Congress  of Obstetricians and Gynecologists (ACOG) guidelines for prevention of   group B streptococcal (GBS) disease specify co-collection of  a vaginal and rectal swab specimen to maximize sensitivity of GBS  detection. Per the CDC and ACOG, swabbing both the lower vagina and  rectum substantially increases the yield of detection compared with  sampling the vagina alone.  Penicillin G, ampicillin, or cefazolin are indicated for intrapartum  prophylaxis of  GBS colonization. Reflex susceptibility  testing should be performed prior to use of clindamycin only on GBS  isolates from penicillin-allergic women who are considered a high risk  for anaphylaxis. Treatment with vancomycin without additional testing  is warranted if resistance to clindamycin is noted.         Narrative:      Performed at:   - [facility]  [id number]  : Virgil Escalera PhD, Phone:  [phone]                             Labs Pending at Discharge:    Not applicable    Discharge Details        Discharge Medications        New Medications        Instructions Start Date   ferrous sulfate 325 (65 FE) MG tablet   325 mg, Oral, Every Other Day      ibuprofen 600 MG tablet  Commonly known as: ADVIL,MOTRIN   600 mg, Oral, Every 6 Hours      oxyCODONE-acetaminophen 5-325 MG  per tablet  Commonly known as: Percocet   1 tablet, Oral, Every 6 Hours PRN             Continue These Medications        Instructions Start Date   prenatal vitamin 27-0.8 27-0.8 MG tablet tablet   1 tablet, Oral, Daily             Stop These Medications      calcium carbonate 500 MG chewable tablet  Commonly known as: TUMS              No Known Allergies      Discharge Disposition:  Home or Self Care    Diet:  Hospital:  Diet Order   Procedures    Diet: Regular/House Diet; Texture: Regular Texture (IDDSI 7); Fluid Consistency: Thin (IDDSI 0)         Discharge Activity:   Activity Instructions       Driving Restrictions      Do not drive while taking narcotic medications or while still having significant abdominal pain.    Type of Restriction: Driving    Driving Restrictions: No Driving While Taking Narcotics          No exercise, strenuous activity, or heavy lifting for 6 weeks after delivery.  It is normal to have light to moderate vaginal bleeding for up to 6 weeks after delivery.  You may shower.  No submerging in a bathtub for 6 weeks after delivery.  Do not resume sexual intercourse for 6 weeks.  Call the office or return to the hospital for temperature greater than 100.5°F, heavy vaginal bleeding soaking a pad an hour, severe abdominal or vaginal  pain not improved with pain medication, persistent nausea and vomiting, wound breakdown, or any other medical concerns.  Follow-up with your OB/GYN in 2 weeks.  Please call the office to make an appointment.      CODE STATUS:  Code Status and Medical Interventions:   Ordered at: 07/20/23 1745     Code Status (Patient has no pulse and is not breathing):    CPR (Attempt to Resuscitate)     Medical Interventions (Patient has pulse or is breathing):    Full     Release to patient:    Routine Release         No future appointments.    Additional Instructions for the Follow-ups that You Need to Schedule       Call MD With Problems / Concerns   As directed       Instructions: Call for fever greater than 100.5, severe abdominal pain, drainage or bleeding from the incision, heavy vaginal bleeding greater than 1 pad per hour for more than 2 hours, or any other major medical concerns    Order Comments: Instructions: Call for fever greater than 100.5, severe abdominal pain, drainage or bleeding from the incision, heavy vaginal bleeding greater than 1 pad per hour for more than 2 hours, or any other major medical concerns          Discharge Follow-up with Specified Provider: your OBGYN; 2 Weeks   As directed      To: your OBGYN    Follow Up: 2 Weeks                 Time spent on Discharge including face to face service: Greater than 30 minutes    Carlos Lopez MD

## 2023-07-22 NOTE — LACTATION NOTE
Patient states baby has been latching well but falls asleep easily at breast. She feels her milk is starting to come in and said she may start pumping today. LC educated patient on how to avoid oversupply by delaying pumping and focusing on latch in first 2-3 weeks. Comfort measures for engorgement and how prepare nipple for deep latch if breasts become very firm. Enc follow -up in [facility] numbers provided.

## 2023-07-22 NOTE — PLAN OF CARE
Problem: Adult Inpatient Plan of Care  Goal: Plan of Care Review  Outcome: Ongoing, Progressing  Flowsheets (Taken 7/21/2023 2031)  Progress: improving  Plan of Care Reviewed With:   patient   spouse  Outcome Evaluation: VSS, assessments wnl, voiding and passing gas, working on breastfeeding, pain well controlled, ambualting well. fundal checks wnl, possible d/c tmrw.  Goal: Patient-Specific Goal (Individualized)  Outcome: Ongoing, Progressing  Goal: Absence of Hospital-Acquired Illness or Injury  Outcome: Ongoing, Progressing  Intervention: Identify and Manage Fall Risk  Recent Flowsheet Documentation  Taken 7/21/2023 2015 by Marcia Hernadez RN  Safety Promotion/Fall Prevention: safety round/check completed  Intervention: Prevent Skin Injury  Recent Flowsheet Documentation  Taken 7/21/2023 2015 by Marcia Hernadez RN  Body Position: position changed independently  Intervention: Prevent and Manage VTE (Venous Thromboembolism) Risk  Recent Flowsheet Documentation  Taken 7/21/2023 2015 by Marcia Hernadez RN  Activity Management: up ad jens  Goal: Optimal Comfort and Wellbeing  Outcome: Ongoing, Progressing  Intervention: Monitor Pain and Promote Comfort  Recent Flowsheet Documentation  Taken 7/21/2023 2015 by Marcia Hernadez RN  Pain Management Interventions:   care clustered   quiet environment facilitated  Intervention: Provide Person-Centered Care  Recent Flowsheet Documentation  Taken 7/21/2023 2015 by Marcia Hernadez RN  Trust Relationship/Rapport:   care explained   choices provided  Goal: Readiness for Transition of Care  Outcome: Ongoing, Progressing     Problem: Bleeding (Labor)  Goal: Hemostasis  Outcome: Ongoing, Progressing     Problem: Change in Fetal Wellbeing (Labor)  Goal: Stable Fetal Wellbeing  Outcome: Ongoing, Progressing  Intervention: Promote and Monitor Fetal Wellbeing  Recent Flowsheet Documentation  Taken 7/21/2023 2015 by Marcia Hernadez RN  Body Position: position changed independently      Problem: Delayed Labor Progression (Labor)  Goal: Effective Progression to Delivery  Outcome: Ongoing, Progressing     Problem: Infection (Labor)  Goal: Absence of Infection Signs and Symptoms  Outcome: Ongoing, Progressing     Problem: Labor Pain (Labor)  Goal: Acceptable Pain Control  Outcome: Ongoing, Progressing     Problem: Uterine Tachysystole (Labor)  Goal: Normal Uterine Contraction Pattern  Outcome: Ongoing, Progressing     Problem: Pain Acute  Goal: Acceptable Pain Control and Functional Ability  Outcome: Ongoing, Progressing  Intervention: Develop Pain Management Plan  Recent Flowsheet Documentation  Taken 2023 by Marcia Hernadez RN  Pain Management Interventions:   care clustered   quiet environment facilitated     Problem:  Fall Injury Risk  Goal: Absence of Fall, Infant Drop and Related Injury  Outcome: Ongoing, Progressing  Intervention: Promote Injury-Free Environment  Recent Flowsheet Documentation  Taken 2023 by Marcia Hernadez RN  Safety Promotion/Fall Prevention: safety round/check completed     Problem: Skin Injury Risk Increased  Goal: Skin Health and Integrity  Outcome: Ongoing, Progressing     Problem: Adjustment to Role Transition (Postpartum  Delivery)  Goal: Successful Maternal Role Transition  Outcome: Ongoing, Progressing     Problem: Bleeding (Postpartum  Delivery)  Goal: Hemostasis  Outcome: Ongoing, Progressing     Problem: Infection (Postpartum  Delivery)  Goal: Absence of Infection Signs and Symptoms  Outcome: Ongoing, Progressing     Problem: Pain (Postpartum  Delivery)  Goal: Acceptable Pain Control  Outcome: Ongoing, Progressing  Intervention: Prevent or Manage Pain  Recent Flowsheet Documentation  Taken 2023 by Marcia Hernadez RN  Pain Management Interventions:   care clustered   quiet environment facilitated     Problem: Postoperative Nausea and Vomiting (Postpartum  Delivery)  Goal: Nausea and  Vomiting Relief  Outcome: Ongoing, Progressing     Problem: Postoperative Urinary Retention (Postpartum  Delivery)  Goal: Effective Urinary Elimination  Outcome: Ongoing, Progressing   Goal Outcome Evaluation:  Plan of Care Reviewed With: patient, spouse        Progress: improving  Outcome Evaluation: VSS, assessments wnl, voiding and passing gas, working on breastfeeding, pain well controlled, ambualting well. fundal checks wnl, possible d/c tmrw.

## 2023-07-22 NOTE — PROGRESS NOTES
Assessment/Plan:  Status post  section, postoperative day #2. Doing well postoperatively.   2.  Anemia of pregnancy, delivered, asymptomatic  3.  Obesity, on Lovenox for DVT prophylaxis    Plan:  1.  Patient requesting discharge home today.  She is meeting all milestones for discharge.  Extensive discharge instructions given to the patient verbalized understanding.  She should follow-up in the office with Dr. Miranda in 2 weeks.    No results found for: ABORH  Rh+  Rubella: Immune  Infant: Female      Subjective:  Postpartum Day 2:  Delivery    The patient feels well.Pain is well controlled with current medications. Urinary output is adequate. The patient is ambulating well. The patient is tolerating a normal diet. Flatus has been passed.    Objective:  Vital signs in last 24 hours:  Temp:  [97.5 °F (36.4 °C)-98.2 °F (36.8 °C)] 97.5 °F (36.4 °C)  Heart Rate:  [69-91] 87  Resp:  [16-18] 16  BP: (100-128)/(66-82) 128/82    I/O last 3 completed shifts:  In: 2290 [P.O.:2290]  Out: 4000 [Urine:4000]  I/O this shift:  In: 480 [P.O.:480]  Out: 600 [Urine:600]          General:    alert, appears stated age, and cooperative   Uterine Fundus:   firm   Incision:  healing well, no significant drainage, no dehiscence, no significant erythema   DVT Evaluation:  No evidence of DVT seen on physical exam.     Lab Results   Component Value Date    WBC 10.09 2023    HGB 9.5 (L) 2023    HCT 28.8 (L) 2023    MCV 90.3 2023     2023       Lab Results   Component Value Date    GLUCOSE 80 2023    BUN 10 2023    CREATININE 0.70 2023    EGFR 124.8 2023    BCR 14.3 2023    K 4.0 2023    CO2 21.0 (L) 2023    CALCIUM 9.6 2023    ALBUMIN 3.6 2023    BILITOT 0.2 2023    AST 14 2023    ALT 11 2023       No radiology results for the last 3 days    Current meds:  Scheduled Meds:acetaminophen, 650 mg, Oral, Q6H  enoxaparin, 40 mg,  Subcutaneous, Daily  ibuprofen, 600 mg, Oral, Q6H  ketorolac, 30 mg, Intravenous, Once      Continuous Infusions:   PRN Meds:.  all purpose nipple ointment    diphenhydrAMINE **OR** diphenhydrAMINE **OR** diphenhydrAMINE    droperidol **OR** droperidol    Hydrocortisone (Perianal)    hydrOXYzine    naloxone    ondansetron    ondansetron **OR** ondansetron    oxyCODONE **OR** oxyCODONE    promethazine **OR** promethazine    simethicone

## 2023-08-02 ENCOUNTER — OFFICE VISIT (OUTPATIENT)
Dept: OBSTETRICS AND GYNECOLOGY | Facility: CLINIC | Age: 23
End: 2023-08-02
Payer: COMMERCIAL

## 2023-08-02 VITALS
HEIGHT: 64 IN | SYSTOLIC BLOOD PRESSURE: 111 MMHG | WEIGHT: 208 LBS | HEART RATE: 69 BPM | DIASTOLIC BLOOD PRESSURE: 74 MMHG | BODY MASS INDEX: 35.51 KG/M2

## 2023-08-02 DIAGNOSIS — F41.8 POSTPARTUM ANXIETY: ICD-10-CM

## 2023-08-02 DIAGNOSIS — Z48.89 ENCOUNTER FOR POSTOPERATIVE WOUND CHECK: Primary | ICD-10-CM

## 2023-08-02 DIAGNOSIS — R39.89 SENSATION OF PRESSURE IN BLADDER AREA: ICD-10-CM

## 2023-08-02 RX ORDER — ESCITALOPRAM OXALATE 20 MG/1
20 TABLET ORAL DAILY
Qty: 90 TABLET | Refills: 1 | Status: SHIPPED | OUTPATIENT
Start: 2023-08-02 | End: 2024-08-01

## 2023-08-04 LAB
BACTERIA UR CULT: NO GROWTH
BACTERIA UR CULT: NORMAL

## 2023-08-30 ENCOUNTER — POSTPARTUM VISIT (OUTPATIENT)
Dept: OBSTETRICS AND GYNECOLOGY | Facility: CLINIC | Age: 23
End: 2023-08-30
Payer: COMMERCIAL

## 2023-08-30 VITALS
DIASTOLIC BLOOD PRESSURE: 71 MMHG | WEIGHT: 207 LBS | HEART RATE: 71 BPM | BODY MASS INDEX: 35.34 KG/M2 | SYSTOLIC BLOOD PRESSURE: 104 MMHG | HEIGHT: 64 IN

## 2023-08-30 DIAGNOSIS — Z30.011 ENCOUNTER FOR INITIAL PRESCRIPTION OF CONTRACEPTIVE PILLS: ICD-10-CM

## 2023-08-30 PROBLEM — O28.8 AFI (AMNIOTIC FLUID INDEX) DECREASED: Status: RESOLVED | Noted: 2023-07-18 | Resolved: 2023-08-30

## 2023-08-30 PROBLEM — Z98.891 STATUS POST CESAREAN DELIVERY: Status: RESOLVED | Noted: 2023-07-22 | Resolved: 2023-08-30

## 2023-08-30 PROCEDURE — 0503F POSTPARTUM CARE VISIT: CPT | Performed by: OBSTETRICS & GYNECOLOGY

## 2023-08-30 RX ORDER — NORGESTIMATE AND ETHINYL ESTRADIOL 0.25-0.035
1 KIT ORAL DAILY
Qty: 84 TABLET | Refills: 3 | Status: SHIPPED | OUTPATIENT
Start: 2023-08-30

## 2023-08-30 NOTE — PROGRESS NOTES
"Chief Complaint  Postpartum Care- 6 wk pp exam. Repeat pap     Subjective        Rosalina Pardo presents to [facility]  History of Present Illness  Patient is 6 weeks status post primary  at 37 weeks due to failed induction.  She was induced due to low ROXANNE.  The remainder of her pregnancy was uncomplicated.  She has no major complaints today.  She is formula feeding and is currently having her first menstrual period since delivery.  She states her moods are well controlled on her Lexapro.  Plans to return to work on Tuesday.  Objective   Vital Signs:  /71   Pulse 71   Ht 162.6 cm (64\")   Wt 93.9 kg (207 lb)   BMI 35.53 kg/mý   Estimated body mass index is 35.53 kg/mý as calculated from the following:    Height as of this encounter: 162.6 cm (64\").    Weight as of this encounter: 93.9 kg (207 lb).             Physical Exam  Vitals reviewed. Exam conducted with a chaperone present.   Constitutional:       Appearance: She is well-developed.   Cardiovascular:      Rate and Rhythm: Normal rate and regular rhythm.   Pulmonary:      Effort: Pulmonary effort is normal.      Breath sounds: Normal breath sounds.   Chest:   Breasts:     Right: No inverted nipple, mass, nipple discharge, skin change or tenderness.      Left: No inverted nipple, mass, nipple discharge, skin change or tenderness.   Abdominal:      General: There is no distension.      Palpations: Abdomen is soft.      Tenderness: There is no abdominal tenderness.   Genitourinary:     Labia:         Right: No rash, tenderness, lesion or injury.         Left: No rash, tenderness, lesion or injury.       Vagina: Normal.      Cervix: Normal.      Uterus: Normal.       Adnexa:         Right: No mass, tenderness or fullness.          Left: No mass, tenderness or fullness.     Neurological:      Mental Status: She is alert.      Result Review :                   Assessment and Plan   Diagnoses and all orders for this " visit:    1. Routine postpartum follow-up (Primary)  -     IGP, Rfx Aptima HPV ASCU    2. Encounter for initial prescription of contraceptive pills  -     norgestimate-ethinyl estradiol (Sprintec 28) 0.25-35 MG-MCG per tablet; Take 1 tablet by mouth Daily.  Dispense: 84 tablet; Refill: 3    She is doing well postpartum and may resume all normal activities with no restrictions.  She was counseled on birth control and would like to start an oral contraceptive pill.  She was advised she may start it today since she is currently on her menstrual period.         Follow Up   Return in about 1 year (around 8/30/2024) for gynecological exam.  Patient was given instructions and counseling regarding her condition or for health maintenance advice. Please see specific information pulled into the AVS if appropriate.

## 2023-09-07 LAB
CONV .: ABNORMAL
CYTOLOGIST CVX/VAG CYTO: ABNORMAL
CYTOLOGY CVX/VAG DOC CYTO: ABNORMAL
CYTOLOGY CVX/VAG DOC THIN PREP: ABNORMAL
DX ICD CODE: ABNORMAL
DX ICD CODE: ABNORMAL
HIV 1 & 2 AB SER-IMP: ABNORMAL
HPV I/H RISK 4 DNA CVX QL PROBE+SIG AMP: NEGATIVE
OTHER STN SPEC: ABNORMAL
PATHOLOGIST CVX/VAG CYTO: ABNORMAL
RECOM F/U CVX/VAG CYTO: ABNORMAL
STAT OF ADQ CVX/VAG CYTO-IMP: ABNORMAL

## 2023-10-19 RX ORDER — BUPROPION HYDROCHLORIDE 150 MG/1
150 TABLET ORAL EVERY MORNING
Qty: 30 TABLET | Refills: 2 | Status: SHIPPED | OUTPATIENT
Start: 2023-10-19

## 2023-11-15 ENCOUNTER — OFFICE VISIT (OUTPATIENT)
Dept: INTERNAL MEDICINE | Facility: CLINIC | Age: 23
End: 2023-11-15
Payer: COMMERCIAL

## 2023-11-15 VITALS
OXYGEN SATURATION: 98 % | SYSTOLIC BLOOD PRESSURE: 118 MMHG | DIASTOLIC BLOOD PRESSURE: 90 MMHG | HEIGHT: 65 IN | WEIGHT: 209.8 LBS | BODY MASS INDEX: 34.95 KG/M2 | HEART RATE: 67 BPM

## 2023-11-15 DIAGNOSIS — F90.9 ATTENTION DEFICIT HYPERACTIVITY DISORDER (ADHD), UNSPECIFIED ADHD TYPE: ICD-10-CM

## 2023-11-15 RX ORDER — LISDEXAMFETAMINE DIMESYLATE CAPSULES 10 MG/1
10 CAPSULE ORAL EVERY MORNING
Qty: 30 CAPSULE | Refills: 0 | Status: SHIPPED | OUTPATIENT
Start: 2023-11-15 | End: 2023-12-17

## 2023-11-15 RX ORDER — LISDEXAMFETAMINE DIMESYLATE CAPSULES 10 MG/1
10 CAPSULE ORAL EVERY MORNING
Qty: 30 CAPSULE | Refills: 0 | Status: SHIPPED | OUTPATIENT
Start: 2024-01-10 | End: 2024-02-09

## 2023-11-15 RX ORDER — LISDEXAMFETAMINE DIMESYLATE CAPSULES 10 MG/1
10 CAPSULE ORAL EVERY MORNING
Qty: 30 CAPSULE | Refills: 0 | Status: SHIPPED | OUTPATIENT
Start: 2023-12-16 | End: 2024-01-15

## 2023-11-15 NOTE — ASSESSMENT & PLAN NOTE
Patient with an established history of major depression, was previously taking Lexapro however stopped during her pregnancy.  In the peripartum period again with onset of depressive symptoms, was started on Wellbutrin prior to establishing care at this clinic, has seen a significant improvement in her mood symptoms since an initiation of the same, would like to continue at this time.  Reasonable to continue given patient's previous major depression and positive response to this therapy, will continue Wellbutrin at current dose.

## 2023-11-15 NOTE — PROGRESS NOTES
"Chief Complaint  Establish Care and postpartum depression     Subjective        Rosalina Pardo presents to [facility]  History of Present Illness    #HCM  Recently had a cild via c-cection at 37w in July 2023, recovering well. Follows with Ob/Gn, Dr. Miranda.  No longer breast-feeding, baby otherwise developing well.  Due for routine labs however would like to defer until follow-up visit in 6-week.    #Mood  Previously well controlled on lexapro, switched to well butrin during her peripartum period, overall feels like her symptoms are much better on Wellbutrin    Objective   Vital Signs:  /90 (BP Location: Right arm, Patient Position: Sitting, Cuff Size: Adult)   Pulse 67   Ht 165.1 cm (65\")   Wt 95.2 kg (209 lb 12.8 oz)   SpO2 98%   BMI 34.91 kg/m²   Estimated body mass index is 34.91 kg/m² as calculated from the following:    Height as of this encounter: 165.1 cm (65\").    Weight as of this encounter: 95.2 kg (209 lb 12.8 oz).             Physical Exam  Vitals reviewed.   Constitutional:       General: She is not in acute distress.     Appearance: Normal appearance.   HENT:      Head: Normocephalic and atraumatic.   Eyes:      General: No scleral icterus.     Extraocular Movements: Extraocular movements intact.      Pupils: Pupils are equal, round, and reactive to light.   Cardiovascular:      Rate and Rhythm: Normal rate and regular rhythm.      Heart sounds: No murmur heard.     No friction rub. No gallop.   Pulmonary:      Effort: Pulmonary effort is normal.      Breath sounds: Normal breath sounds. No wheezing, rhonchi or rales.   Abdominal:      General: Abdomen is flat. Bowel sounds are normal. There is no distension.      Palpations: Abdomen is soft.      Tenderness: There is no abdominal tenderness. There is no guarding.   Musculoskeletal:         General: Normal range of motion.      Right lower leg: No edema.      Left lower leg: No edema.   Skin:     General: Skin " is warm and dry.      Capillary Refill: Capillary refill takes less than 2 seconds.      Coloration: Skin is not jaundiced.      Findings: No rash.   Neurological:      General: No focal deficit present.      Mental Status: She is alert and oriented to person, place, and time.   Psychiatric:         Mood and Affect: Mood normal.         Behavior: Behavior normal.        Result Review :  The following data was reviewed by: David Armendariz MD on 11/15/2023:  CMP          7/5/2023    16:48   CMP   Glucose 80    BUN 10    Creatinine 0.70    EGFR 124.8    Sodium 136    Potassium 4.0    Chloride 103    Calcium 9.6    Total Protein 6.9    Albumin 3.6    Globulin 3.3    Total Bilirubin 0.2    Alkaline Phosphatase 129    AST (SGOT) 14    ALT (SGPT) 11    Albumin/Globulin Ratio 1.1    BUN/Creatinine Ratio 14.3    Anion Gap 12.0      CBC          7/5/2023    16:48 7/18/2023    18:09 7/21/2023    07:49   CBC   WBC 13.67  13.27  10.09    RBC 3.79  3.62  3.19    Hemoglobin 11.8  11.2  9.5    Hematocrit 33.7  32.5  28.8    MCV 88.9  89.8  90.3    MCH 31.1  30.9  29.8    MCHC 35.0  34.5  33.0    RDW 12.0  12.2  12.0    Platelets 355  359  291                         Assessment and Plan   Diagnoses and all orders for this visit:    1. Post partum depression (Primary)  Assessment & Plan:  Patient with an established history of major depression, was previously taking Lexapro however stopped during her pregnancy.  In the peripartum period again with onset of depressive symptoms, was started on Wellbutrin prior to establishing care at this clinic, has seen a significant improvement in her mood symptoms since an initiation of the same, would like to continue at this time.  Reasonable to continue given patient's previous major depression and positive response to this therapy, will continue Wellbutrin at current dose.      2. Attention deficit hyperactivity disorder (ADHD), unspecified ADHD type  Assessment & Plan:  Patient previously  diagnosed in childhood with ADHD, was stable on Vyvanse for quite some time however was switched to Adderall due to insurance reasons.  Stopped taking during her pregnancy, however would like to restart her Vyvanse at this time if possible, she feels her insurance will cover it at this point.  Reasonable to restart however given concomitant therapy with Wellbutrin we will start a low-dose and uptitrate at 6-week follow-up visit.    Orders:  -     lisdexamfetamine dimesylate (Vyvanse) 10 MG capsule; Take 1 capsule by mouth Every Morning.  Dispense: 30 capsule; Refill: 0  -     lisdexamfetamine dimesylate (Vyvanse) 10 MG capsule; Take 1 capsule by mouth Every Morning for 30 days  Dispense: 30 capsule; Refill: 0  -     lisdexamfetamine dimesylate (Vyvanse) 10 MG capsule; Take 1 capsule by mouth Every Morning for 30 days  Dispense: 30 capsule; Refill: 0  -     Urine Drug Screen - Urine, Clean Catch             Follow Up   Return in about 6 weeks (around 12/27/2023).  Patient was given instructions and counseling regarding her condition or for health maintenance advice. Please see specific information pulled into the AVS if appropriate.

## 2023-11-15 NOTE — ASSESSMENT & PLAN NOTE
Patient previously diagnosed in childhood with ADHD, was stable on Vyvanse for quite some time however was switched to Adderall due to insurance reasons.  Stopped taking during her pregnancy, however would like to restart her Vyvanse at this time if possible, she feels her insurance will cover it at this point.  Reasonable to restart however given concomitant therapy with Wellbutrin we will start a low-dose and uptitrate at 6-week follow-up visit.

## 2023-11-16 ENCOUNTER — TELEPHONE (OUTPATIENT)
Dept: INTERNAL MEDICINE | Facility: CLINIC | Age: 23
End: 2023-11-16

## 2023-11-16 LAB
AMPHETAMINES UR QL SCN: NEGATIVE NG/ML
BARBITURATES UR QL SCN: NEGATIVE NG/ML
BENZODIAZ UR QL SCN: NEGATIVE NG/ML
BZE UR QL SCN: NEGATIVE NG/ML
CANNABINOIDS UR QL SCN: NEGATIVE NG/ML
CREAT UR-MCNC: 200.8 MG/DL (ref 20–300)
LABORATORY COMMENT REPORT: NORMAL
METHADONE UR QL SCN: NEGATIVE NG/ML
OPIATES UR QL SCN: NEGATIVE NG/ML
OXYCODONE+OXYMORPHONE UR QL SCN: NEGATIVE NG/ML
PCP UR QL: NEGATIVE NG/ML
PH UR: 5.6 [PH] (ref 4.5–8.9)
PROPOXYPH UR QL SCN: NEGATIVE NG/ML

## 2023-11-16 NOTE — TELEPHONE ENCOUNTER
Caller: Rosalina Pardo    Relationship: Self    Best call back number: [phone]     What was the call regarding: PHARMACY STATES THE VYVANSE NEEDS A P.A. PLEASE CALL WHEN DONE OR OK TO REACH THRU MYCHART.

## 2023-12-05 DIAGNOSIS — F90.9 ATTENTION DEFICIT HYPERACTIVITY DISORDER (ADHD), UNSPECIFIED ADHD TYPE: ICD-10-CM

## 2023-12-06 RX ORDER — LISDEXAMFETAMINE DIMESYLATE CAPSULES 10 MG/1
10 CAPSULE ORAL EVERY MORNING
Qty: 30 CAPSULE | Refills: 0 | Status: SHIPPED | OUTPATIENT
Start: 2023-12-06 | End: 2024-01-05

## 2023-12-16 ENCOUNTER — APPOINTMENT (OUTPATIENT)
Dept: CT IMAGING | Facility: HOSPITAL | Age: 23
End: 2023-12-16
Payer: COMMERCIAL

## 2023-12-16 ENCOUNTER — HOSPITAL ENCOUNTER (OUTPATIENT)
Facility: HOSPITAL | Age: 23
Setting detail: OBSERVATION
Discharge: HOME | End: 2023-12-18
Attending: STUDENT IN AN ORGANIZED HEALTH CARE EDUCATION/TRAINING PROGRAM | Admitting: STUDENT IN AN ORGANIZED HEALTH CARE EDUCATION/TRAINING PROGRAM
Payer: COMMERCIAL

## 2023-12-16 DIAGNOSIS — N12 PYELONEPHRITIS: Primary | ICD-10-CM

## 2023-12-16 LAB
ALBUMIN SERPL-MCNC: 4.2 G/DL (ref 3.5–5.2)
ALBUMIN/GLOB SERPL: 1.2 G/DL
ALP SERPL-CCNC: 93 U/L (ref 39–117)
ALT SERPL W P-5'-P-CCNC: 18 U/L (ref 1–33)
ANION GAP SERPL CALCULATED.3IONS-SCNC: 13.9 MMOL/L (ref 5–15)
AST SERPL-CCNC: 18 U/L (ref 1–32)
B-HCG UR QL: NEGATIVE
BACTERIA UR QL AUTO: ABNORMAL /HPF
BASOPHILS # BLD AUTO: 0.06 10*3/MM3 (ref 0–0.2)
BASOPHILS NFR BLD AUTO: 0.4 % (ref 0–1.5)
BILIRUB SERPL-MCNC: 0.5 MG/DL (ref 0–1.2)
BILIRUB UR QL STRIP: NEGATIVE
BUN SERPL-MCNC: 8 MG/DL (ref 6–20)
BUN/CREAT SERPL: 7.1 (ref 7–25)
CALCIUM SPEC-SCNC: 9.7 MG/DL (ref 8.6–10.5)
CHLORIDE SERPL-SCNC: 101 MMOL/L (ref 98–107)
CLARITY UR: CLEAR
CO2 SERPL-SCNC: 22.1 MMOL/L (ref 22–29)
COLOR UR: YELLOW
CREAT SERPL-MCNC: 1.13 MG/DL (ref 0.57–1)
D-LACTATE SERPL-SCNC: 1 MMOL/L (ref 0.5–2)
D-LACTATE SERPL-SCNC: 2.1 MMOL/L (ref 0.5–2)
DEPRECATED RDW RBC AUTO: 41.3 FL (ref 37–54)
EGFRCR SERPLBLD CKD-EPI 2021: 70.3 ML/MIN/1.73
EOSINOPHIL # BLD AUTO: 0.06 10*3/MM3 (ref 0–0.4)
EOSINOPHIL NFR BLD AUTO: 0.4 % (ref 0.3–6.2)
ERYTHROCYTE [DISTWIDTH] IN BLOOD BY AUTOMATED COUNT: 12.7 % (ref 12.3–15.4)
GLOBULIN UR ELPH-MCNC: 3.6 GM/DL
GLUCOSE SERPL-MCNC: 99 MG/DL (ref 65–99)
GLUCOSE UR STRIP-MCNC: NEGATIVE MG/DL
HCT VFR BLD AUTO: 37.5 % (ref 34–46.6)
HGB BLD-MCNC: 12.7 G/DL (ref 12–15.9)
HGB UR QL STRIP.AUTO: ABNORMAL
HYALINE CASTS UR QL AUTO: ABNORMAL /LPF
IMM GRANULOCYTES # BLD AUTO: 0.05 10*3/MM3 (ref 0–0.05)
IMM GRANULOCYTES NFR BLD AUTO: 0.4 % (ref 0–0.5)
KETONES UR QL STRIP: NEGATIVE
LEUKOCYTE ESTERASE UR QL STRIP.AUTO: ABNORMAL
LIPASE SERPL-CCNC: 14 U/L (ref 13–60)
LYMPHOCYTES # BLD AUTO: 0.84 10*3/MM3 (ref 0.7–3.1)
LYMPHOCYTES NFR BLD AUTO: 6 % (ref 19.6–45.3)
MCH RBC QN AUTO: 30 PG (ref 26.6–33)
MCHC RBC AUTO-ENTMCNC: 33.9 G/DL (ref 31.5–35.7)
MCV RBC AUTO: 88.7 FL (ref 79–97)
MONOCYTES # BLD AUTO: 0.82 10*3/MM3 (ref 0.1–0.9)
MONOCYTES NFR BLD AUTO: 5.8 % (ref 5–12)
NEUTROPHILS NFR BLD AUTO: 12.26 10*3/MM3 (ref 1.7–7)
NEUTROPHILS NFR BLD AUTO: 87 % (ref 42.7–76)
NITRITE UR QL STRIP: NEGATIVE
NRBC BLD AUTO-RTO: 0 /100 WBC (ref 0–0.2)
PH UR STRIP.AUTO: 6 [PH] (ref 4.5–8)
PLATELET # BLD AUTO: 373 10*3/MM3 (ref 140–450)
PMV BLD AUTO: 9.7 FL (ref 6–12)
POTASSIUM SERPL-SCNC: 4.3 MMOL/L (ref 3.5–5.2)
PROT SERPL-MCNC: 7.8 G/DL (ref 6–8.5)
PROT UR QL STRIP: ABNORMAL
RBC # BLD AUTO: 4.23 10*6/MM3 (ref 3.77–5.28)
RBC # UR STRIP: ABNORMAL /HPF
REF LAB TEST METHOD: ABNORMAL
SODIUM SERPL-SCNC: 137 MMOL/L (ref 136–145)
SP GR UR STRIP: 1.02 (ref 1–1.03)
SQUAMOUS #/AREA URNS HPF: ABNORMAL /HPF
UROBILINOGEN UR QL STRIP: ABNORMAL
WBC # UR STRIP: ABNORMAL /HPF
WBC NRBC COR # BLD AUTO: 14.09 10*3/MM3 (ref 3.4–10.8)

## 2023-12-16 PROCEDURE — 25010000002 KETOROLAC TROMETHAMINE PER 15 MG: Performed by: STUDENT IN AN ORGANIZED HEALTH CARE EDUCATION/TRAINING PROGRAM

## 2023-12-16 PROCEDURE — 25010000002 MORPHINE PER 10 MG

## 2023-12-16 PROCEDURE — 87086 URINE CULTURE/COLONY COUNT: CPT | Performed by: STUDENT IN AN ORGANIZED HEALTH CARE EDUCATION/TRAINING PROGRAM

## 2023-12-16 PROCEDURE — 83690 ASSAY OF LIPASE: CPT | Performed by: STUDENT IN AN ORGANIZED HEALTH CARE EDUCATION/TRAINING PROGRAM

## 2023-12-16 PROCEDURE — G0378 HOSPITAL OBSERVATION PER HR: HCPCS

## 2023-12-16 PROCEDURE — 81001 URINALYSIS AUTO W/SCOPE: CPT | Performed by: STUDENT IN AN ORGANIZED HEALTH CARE EDUCATION/TRAINING PROGRAM

## 2023-12-16 PROCEDURE — 25810000003 LACTATED RINGERS SOLUTION: Performed by: STUDENT IN AN ORGANIZED HEALTH CARE EDUCATION/TRAINING PROGRAM

## 2023-12-16 PROCEDURE — 25810000003 LACTATED RINGERS PER 1000 ML: Performed by: STUDENT IN AN ORGANIZED HEALTH CARE EDUCATION/TRAINING PROGRAM

## 2023-12-16 PROCEDURE — 96376 TX/PRO/DX INJ SAME DRUG ADON: CPT

## 2023-12-16 PROCEDURE — 96361 HYDRATE IV INFUSION ADD-ON: CPT

## 2023-12-16 PROCEDURE — 87077 CULTURE AEROBIC IDENTIFY: CPT | Performed by: STUDENT IN AN ORGANIZED HEALTH CARE EDUCATION/TRAINING PROGRAM

## 2023-12-16 PROCEDURE — 74176 CT ABD & PELVIS W/O CONTRAST: CPT

## 2023-12-16 PROCEDURE — 96365 THER/PROPH/DIAG IV INF INIT: CPT

## 2023-12-16 PROCEDURE — 25010000002 CEFTRIAXONE PER 250 MG: Performed by: STUDENT IN AN ORGANIZED HEALTH CARE EDUCATION/TRAINING PROGRAM

## 2023-12-16 PROCEDURE — 80053 COMPREHEN METABOLIC PANEL: CPT | Performed by: STUDENT IN AN ORGANIZED HEALTH CARE EDUCATION/TRAINING PROGRAM

## 2023-12-16 PROCEDURE — 87040 BLOOD CULTURE FOR BACTERIA: CPT | Performed by: STUDENT IN AN ORGANIZED HEALTH CARE EDUCATION/TRAINING PROGRAM

## 2023-12-16 PROCEDURE — 96375 TX/PRO/DX INJ NEW DRUG ADDON: CPT

## 2023-12-16 PROCEDURE — 25010000002 ONDANSETRON PER 1 MG: Performed by: STUDENT IN AN ORGANIZED HEALTH CARE EDUCATION/TRAINING PROGRAM

## 2023-12-16 PROCEDURE — 87186 SC STD MICRODIL/AGAR DIL: CPT | Performed by: STUDENT IN AN ORGANIZED HEALTH CARE EDUCATION/TRAINING PROGRAM

## 2023-12-16 PROCEDURE — 85025 COMPLETE CBC W/AUTO DIFF WBC: CPT | Performed by: STUDENT IN AN ORGANIZED HEALTH CARE EDUCATION/TRAINING PROGRAM

## 2023-12-16 PROCEDURE — 81025 URINE PREGNANCY TEST: CPT | Performed by: STUDENT IN AN ORGANIZED HEALTH CARE EDUCATION/TRAINING PROGRAM

## 2023-12-16 PROCEDURE — 25010000002 CEFTRIAXONE PER 250 MG

## 2023-12-16 PROCEDURE — 36415 COLL VENOUS BLD VENIPUNCTURE: CPT

## 2023-12-16 PROCEDURE — 25010000002 MORPHINE PER 10 MG: Performed by: STUDENT IN AN ORGANIZED HEALTH CARE EDUCATION/TRAINING PROGRAM

## 2023-12-16 PROCEDURE — 99284 EMERGENCY DEPT VISIT MOD MDM: CPT

## 2023-12-16 PROCEDURE — 83605 ASSAY OF LACTIC ACID: CPT | Performed by: STUDENT IN AN ORGANIZED HEALTH CARE EDUCATION/TRAINING PROGRAM

## 2023-12-16 PROCEDURE — 99223 1ST HOSP IP/OBS HIGH 75: CPT | Performed by: STUDENT IN AN ORGANIZED HEALTH CARE EDUCATION/TRAINING PROGRAM

## 2023-12-16 RX ORDER — MORPHINE SULFATE 2 MG/ML
1 INJECTION, SOLUTION INTRAMUSCULAR; INTRAVENOUS EVERY 4 HOURS PRN
Status: DISCONTINUED | OUTPATIENT
Start: 2023-12-16 | End: 2023-12-18

## 2023-12-16 RX ORDER — CEFTRIAXONE 1 G/1
INJECTION, POWDER, FOR SOLUTION INTRAMUSCULAR; INTRAVENOUS
Status: COMPLETED
Start: 2023-12-16 | End: 2023-12-16

## 2023-12-16 RX ORDER — CEFTRIAXONE 1 G/50ML
1000 INJECTION, SOLUTION INTRAVENOUS ONCE
Status: DISCONTINUED | OUTPATIENT
Start: 2023-12-16 | End: 2023-12-16

## 2023-12-16 RX ORDER — OXYCODONE HYDROCHLORIDE 5 MG/1
5 TABLET ORAL EVERY 4 HOURS PRN
Status: DISCONTINUED | OUTPATIENT
Start: 2023-12-16 | End: 2023-12-18 | Stop reason: HOSPADM

## 2023-12-16 RX ORDER — BISACODYL 5 MG/1
5 TABLET, DELAYED RELEASE ORAL DAILY PRN
Status: DISCONTINUED | OUTPATIENT
Start: 2023-12-16 | End: 2023-12-18 | Stop reason: HOSPADM

## 2023-12-16 RX ORDER — ACETAMINOPHEN 500 MG
TABLET ORAL
Status: DISPENSED
Start: 2023-12-16 | End: 2023-12-16

## 2023-12-16 RX ORDER — CEFTRIAXONE 2 G/50ML
2000 INJECTION, SOLUTION INTRAVENOUS EVERY 24 HOURS
Status: DISCONTINUED | OUTPATIENT
Start: 2023-12-17 | End: 2023-12-18 | Stop reason: HOSPADM

## 2023-12-16 RX ORDER — BISACODYL 10 MG
10 SUPPOSITORY, RECTAL RECTAL DAILY PRN
Status: DISCONTINUED | OUTPATIENT
Start: 2023-12-16 | End: 2023-12-18 | Stop reason: HOSPADM

## 2023-12-16 RX ORDER — ACETAMINOPHEN 325 MG/1
650 TABLET ORAL EVERY 6 HOURS PRN
Status: DISCONTINUED | OUTPATIENT
Start: 2023-12-16 | End: 2023-12-16

## 2023-12-16 RX ORDER — ACETAMINOPHEN 500 MG
TABLET ORAL
Status: COMPLETED
Start: 2023-12-16 | End: 2023-12-16

## 2023-12-16 RX ORDER — SODIUM CHLORIDE 0.9 % (FLUSH) 0.9 %
10 SYRINGE (ML) INJECTION EVERY 12 HOURS SCHEDULED
Status: DISCONTINUED | OUTPATIENT
Start: 2023-12-16 | End: 2023-12-18 | Stop reason: HOSPADM

## 2023-12-16 RX ORDER — AMOXICILLIN 250 MG
2 CAPSULE ORAL 2 TIMES DAILY
Status: DISCONTINUED | OUTPATIENT
Start: 2023-12-16 | End: 2023-12-18 | Stop reason: HOSPADM

## 2023-12-16 RX ORDER — ONDANSETRON 2 MG/ML
4 INJECTION INTRAMUSCULAR; INTRAVENOUS ONCE
Status: COMPLETED | OUTPATIENT
Start: 2023-12-16 | End: 2023-12-16

## 2023-12-16 RX ORDER — SODIUM CHLORIDE 9 MG/ML
40 INJECTION, SOLUTION INTRAVENOUS AS NEEDED
Status: DISCONTINUED | OUTPATIENT
Start: 2023-12-16 | End: 2023-12-18 | Stop reason: HOSPADM

## 2023-12-16 RX ORDER — KETOROLAC TROMETHAMINE 30 MG/ML
15 INJECTION, SOLUTION INTRAMUSCULAR; INTRAVENOUS ONCE
Status: COMPLETED | OUTPATIENT
Start: 2023-12-16 | End: 2023-12-16

## 2023-12-16 RX ORDER — SODIUM CHLORIDE 9 MG/ML
INJECTION, SOLUTION INTRAVENOUS
Status: DISPENSED
Start: 2023-12-16 | End: 2023-12-16

## 2023-12-16 RX ORDER — POLYETHYLENE GLYCOL 3350 17 G/17G
17 POWDER, FOR SOLUTION ORAL DAILY PRN
Status: DISCONTINUED | OUTPATIENT
Start: 2023-12-16 | End: 2023-12-18 | Stop reason: HOSPADM

## 2023-12-16 RX ORDER — SODIUM CHLORIDE, SODIUM LACTATE, POTASSIUM CHLORIDE, CALCIUM CHLORIDE 600; 310; 30; 20 MG/100ML; MG/100ML; MG/100ML; MG/100ML
100 INJECTION, SOLUTION INTRAVENOUS CONTINUOUS
Status: DISCONTINUED | OUTPATIENT
Start: 2023-12-16 | End: 2023-12-18 | Stop reason: HOSPADM

## 2023-12-16 RX ORDER — ACETAMINOPHEN 500 MG
1000 TABLET ORAL ONCE
Status: COMPLETED | OUTPATIENT
Start: 2023-12-16 | End: 2023-12-16

## 2023-12-16 RX ORDER — ACETAMINOPHEN 500 MG
1000 TABLET ORAL EVERY 6 HOURS PRN
Status: DISCONTINUED | OUTPATIENT
Start: 2023-12-16 | End: 2023-12-18 | Stop reason: HOSPADM

## 2023-12-16 RX ORDER — SODIUM CHLORIDE 0.9 % (FLUSH) 0.9 %
10 SYRINGE (ML) INJECTION AS NEEDED
Status: DISCONTINUED | OUTPATIENT
Start: 2023-12-16 | End: 2023-12-18 | Stop reason: HOSPADM

## 2023-12-16 RX ADMIN — ACETAMINOPHEN 1000 MG: 500 TABLET ORAL at 10:00

## 2023-12-16 RX ADMIN — ACETAMINOPHEN 1000 MG: 500 TABLET ORAL at 21:06

## 2023-12-16 RX ADMIN — KETOROLAC TROMETHAMINE 15 MG: 30 INJECTION, SOLUTION INTRAMUSCULAR; INTRAVENOUS at 08:02

## 2023-12-16 RX ADMIN — OXYCODONE HYDROCHLORIDE 5 MG: 5 TABLET ORAL at 21:07

## 2023-12-16 RX ADMIN — SODIUM CHLORIDE, POTASSIUM CHLORIDE, SODIUM LACTATE AND CALCIUM CHLORIDE 100 ML/HR: 600; 310; 30; 20 INJECTION, SOLUTION INTRAVENOUS at 15:41

## 2023-12-16 RX ADMIN — CEFTRIAXONE 1000 MG: 1 INJECTION, POWDER, FOR SOLUTION INTRAMUSCULAR; INTRAVENOUS at 10:00

## 2023-12-16 RX ADMIN — Medication 1000 MG: at 10:00

## 2023-12-16 RX ADMIN — ACETAMINOPHEN 1000 MG: 500 TABLET ORAL at 15:32

## 2023-12-16 RX ADMIN — Medication 2 MG: at 10:32

## 2023-12-16 RX ADMIN — ACETAMINOPHEN 1000 MG: 325 TABLET ORAL at 15:32

## 2023-12-16 RX ADMIN — MORPHINE SULFATE 2 MG: 4 INJECTION INTRAVENOUS at 10:32

## 2023-12-16 RX ADMIN — Medication 10 ML: at 21:07

## 2023-12-16 RX ADMIN — ONDANSETRON 4 MG: 2 INJECTION INTRAMUSCULAR; INTRAVENOUS at 08:00

## 2023-12-16 RX ADMIN — CEFTRIAXONE 1000 MG: 1 INJECTION, POWDER, FOR SOLUTION INTRAMUSCULAR; INTRAVENOUS at 10:01

## 2023-12-16 RX ADMIN — MORPHINE SULFATE 1 MG: 2 INJECTION, SOLUTION INTRAMUSCULAR; INTRAVENOUS at 15:41

## 2023-12-16 RX ADMIN — SODIUM CHLORIDE, POTASSIUM CHLORIDE, SODIUM LACTATE AND CALCIUM CHLORIDE 1000 ML: 600; 310; 30; 20 INJECTION, SOLUTION INTRAVENOUS at 10:35

## 2023-12-16 RX ADMIN — Medication 10 ML: at 14:00

## 2023-12-16 NOTE — H&P
[facility]    Patient Name:  Rosalina Pardo  YOB: 2000  MRN:  [id number]  Admit Date:  2023  Patient Care Team:  Vidal Armendariz MD as PCP - General (Internal Medicine)  Ruma Butcher MD (Neurology)      Subjective   History Present Illness     Chief Complaint   Patient presents with    Flank Pain       Ms. Pardo is a 23 y.o. with a history of frequent urinary tract infections that presents to [facility] complaining of low back pain and fevers at home.      History of Present Illness  She describes having 4-5 urinary tract infections a year.  Usually treated with antibiotics.  Unclear if all were treated based on cultures.  States that this has been a lifelong issue.  Has never been hospitalized due to her urinary tract infection.  Patient shares that she noticed that she most often gets urinary tract infections post intercourse with her partner.  They do not always have symptoms of dysuria or pyuria but rather foul-smelling urine and change in color.  She does occasionally wipe back to front after using the restroom as well as front to back.  Does not recall history of stones.    Review of Systems   Constitutional:  Positive for fever.   HENT: Negative.     Eyes: Negative.    Respiratory: Negative.     Cardiovascular: Negative.    Endocrine: Negative.    Genitourinary:  Positive for flank pain.   Musculoskeletal:  Positive for back pain.   Skin: Negative.    Psychiatric/Behavioral: Negative.          Personal History     Past Medical History:   Diagnosis Date    ADHD (attention deficit hyperactivity disorder)     Chiari I malformation     Scoliosis      Past Surgical History:   Procedure Laterality Date     SECTION N/A 2023    Procedure:  SECTION PRIMARY;  Surgeon: Enriqueta Stokes MD;  Location: [facility] LABOR DELIVERY;  Service: Obstetrics/Gynecology;  Laterality: N/A;    MOUTH SURGERY      TONSILLECTOMY       WISDOM TOOTH EXTRACTION       Family History   Problem Relation Age of Onset    Melanoma Father     Melanoma Mother     Diabetes Maternal Grandmother     Neuropathy Maternal Grandmother     Cancer Maternal Grandfather     Breast cancer Maternal Aunt     Breast cancer Maternal Aunt     Breast cancer Maternal Aunt     Breast cancer Maternal Aunt      Social History     Tobacco Use    Smoking status: Never    Smokeless tobacco: Never   Vaping Use    Vaping Use: Some days    Substances: Nicotine, Flavoring   Substance Use Topics    Alcohol use: Yes     Comment: Occ    Drug use: No     No current facility-administered medications on file prior to encounter.     Current Outpatient Medications on File Prior to Encounter   Medication Sig Dispense Refill    buPROPion XL (Wellbutrin XL) 150 MG 24 hr tablet Take 1 tablet by mouth Every Morning. 30 tablet 2    lisdexamfetamine dimesylate (Vyvanse) 10 MG capsule Take 1 capsule by mouth Every Morning. 30 capsule 0    norgestimate-ethinyl estradiol (Sprintec 28) 0.25-35 MG-MCG per tablet Take 1 tablet by mouth Daily. 84 tablet 3    [DISCONTINUED] Prenatal Vit-Fe Fumarate-FA (prenatal vitamin 27-0.8) 27-0.8 MG tablet tablet Take 1 tablet by mouth Daily.       No Known Allergies    Objective    Objective     Vital Signs  Temp:  [98 °F (36.7 °C)-101.4 °F (38.6 °C)] 101.4 °F (38.6 °C)  Heart Rate:  [] 102  Resp:  [18] 18  BP: (106-116)/(57-75) 116/58  SpO2:  [95 %-98 %] 98 %  on   ;   Device (Oxygen Therapy): room air  Body mass index is 36.18 kg/m².    Physical Exam    Results Review:  I reviewed the patient's new clinical results.  I reviewed the patient's new imaging results and agree with the interpretation.  I reviewed the patient's other test results and agree with the interpretation  I personally viewed and interpreted the patient's EKG/Telemetry data  Discussed with ED provider.    Lab Results (last 24 hours)       Procedure Component Value Units Date/Time    Urinalysis  With Microscopic If Indicated (No Culture) - Urine, Clean Catch [[id number]]  (Abnormal) Collected: 12/16/23 0747    Specimen: Urine, Clean Catch Updated: 12/16/23 0826     Color, UA Yellow     Appearance, UA Clear     pH, UA 6.0     Specific Gravity, UA 1.020     Glucose, UA Negative     Ketones, UA Negative     Bilirubin, UA Negative     Blood, UA Trace     Protein, UA 30 mg/dL (1+)     Leuk Esterase, UA Trace     Nitrite, UA Negative     Urobilinogen, UA 0.2 E.U./dL    Pregnancy, Urine - Urine, Clean Catch [[id number]]  (Normal) Collected: 12/16/23 0747    Specimen: Urine, Clean Catch Updated: 12/16/23 0827     HCG, Urine QL Negative    Urinalysis, Microscopic Only - Urine, Clean Catch [[id number]]  (Abnormal) Collected: 12/16/23 0747    Specimen: Urine, Clean Catch Updated: 12/16/23 0838     RBC, UA 0-2 /HPF      WBC, UA 3-5 /HPF      Bacteria, UA 4+ /HPF      Squamous Epithelial Cells, UA 0-2 /HPF      Hyaline Casts, UA None Seen /LPF      Methodology Manual Light Microscopy    Urine Culture - Urine, Urine, Clean Catch [[id number]] Collected: 12/16/23 0747    Specimen: Urine, Clean Catch Updated: 12/16/23 1647    Comprehensive Metabolic Panel [[id number]]  (Abnormal) Collected: 12/16/23 0804    Specimen: Blood from Arm, Right Updated: 12/16/23 0839     Glucose 99 mg/dL      BUN 8 mg/dL      Creatinine 1.13 mg/dL      Sodium 137 mmol/L      Potassium 4.3 mmol/L      Chloride 101 mmol/L      CO2 22.1 mmol/L      Calcium 9.7 mg/dL      Total Protein 7.8 g/dL      Albumin 4.2 g/dL      ALT (SGPT) 18 U/L      AST (SGOT) 18 U/L      Alkaline Phosphatase 93 U/L      Total Bilirubin 0.5 mg/dL      Globulin 3.6 gm/dL      A/G Ratio 1.2 g/dL      BUN/Creatinine Ratio 7.1     Anion Gap 13.9 mmol/L      eGFR 70.3 mL/min/1.73     Narrative:      GFR Normal >60  Chronic Kidney Disease <60  Kidney Failure <15      CBC & Differential [[id number]]  (Abnormal) Collected: 12/16/23 0804    Specimen: Blood from Arm, Right Updated:  12/16/23 0820    Narrative:      The following orders were created for panel order CBC & Differential.  Procedure                               Abnormality         Status                     ---------                               -----------         ------                     CBC Auto Differential[[id number]]        Abnormal            Final result                 Please view results for these tests on the individual orders.    Lipase [[id number]]  (Normal) Collected: 12/16/23 0804    Specimen: Blood from Arm, Right Updated: 12/16/23 0839     Lipase 14 U/L     CBC Auto Differential [[id number]]  (Abnormal) Collected: 12/16/23 0804    Specimen: Blood from Arm, Right Updated: 12/16/23 0820     WBC 14.09 10*3/mm3      RBC 4.23 10*6/mm3      Hemoglobin 12.7 g/dL      Hematocrit 37.5 %      MCV 88.7 fL      MCH 30.0 pg      MCHC 33.9 g/dL      RDW 12.7 %      RDW-SD 41.3 fl      MPV 9.7 fL      Platelets 373 10*3/mm3      Neutrophil % 87.0 %      Lymphocyte % 6.0 %      Monocyte % 5.8 %      Eosinophil % 0.4 %      Basophil % 0.4 %      Immature Grans % 0.4 %      Neutrophils, Absolute 12.26 10*3/mm3      Lymphocytes, Absolute 0.84 10*3/mm3      Monocytes, Absolute 0.82 10*3/mm3      Eosinophils, Absolute 0.06 10*3/mm3      Basophils, Absolute 0.06 10*3/mm3      Immature Grans, Absolute 0.05 10*3/mm3      nRBC 0.0 /100 WBC     Lactic Acid, Plasma [[id number]]  (Abnormal) Collected: 12/16/23 0943    Specimen: Blood Updated: 12/16/23 1018     Lactate 2.1 mmol/L     Blood Culture - Blood, Arm, Left [[id number]] Collected: 12/16/23 0943    Specimen: Blood from Arm, Left Updated: 12/16/23 0951    Blood Culture - Blood, Arm, Right [[id number]] Collected: 12/16/23 0946    Specimen: Blood from Arm, Right Updated: 12/16/23 0951    STAT Lactic Acid, Reflex [[id number]]  (Normal) Collected: 12/16/23 1237    Specimen: Blood from Arm, Right Updated: 12/16/23 1303     Lactate 1.0 mmol/L             Imaging Results (Last 24 Hours)        Procedure Component Value Units Date/Time    CT Abdomen Pelvis Without Contrast [[id number]] Collected: 12/16/23 0854     Updated: 12/16/23 0907    Narrative:          INDICATION: Right lower back pain    COMPARISON: None    TECHNIQUE:   Routine CT abdomen and pelvis without IV contrast. Coronal and sagittal reformats. Radiation dose reduction techniques included automated exposure control or exposure modulation based on body size. Radiation audit for number of CT and nuclear cardiology  exams performed in the last year: 0.      FINDINGS:     Lung bases: Small amount of subsegmental atelectasis at the bases.    Abdomen: Normal liver, gallbladder, spleen, pancreas and adrenal glands. Asymmetric right perinephric edema. No urolithiasis. No hydronephrosis.    Pelvis: Underdistended bladder. No bladder calculus. Anteverted uterus. No adnexal mass. Dominant follicle on the right ovary.    Bowel: No obstruction. Normal appendix.    Abdominal wall: Pelvic wall scarring.    Retroperitoneum: No lymphadenopathy.    Vasculature: No abdominal aortic aneurysm. Circumaortic left renal vein.    Osseous structures: Bilateral L5 pars defects.      Impression:        1. Asymmetric right perinephric edema. Suspect acute pyonephritis. Recommend correlation with urinalysis.  2. No urolithiasis or hydronephrosis.  3. L5 pars defects    Signer Name: Thaddeus Jacob MD   Signed: 12/16/2023 9:05 AM [facility]  [facility]                No orders to display        Assessment/Plan     Active Hospital Problems    Diagnosis  POA    **Pyelonephritis [N12]  Yes      Resolved Hospital Problems   No resolved problems to display.       Ms. Pardo is a 23 y.o. with a history of frequent urinary tract infections presenting with probable pyelonephritis    Acute pyelonephritis  Reviewed patient's previous cultures, consistent with E. coli and have been sensitive to ceftriaxoneNoted that patient describes several episodes of UTIs but not  every urinalysis has a positive culture.  We discussed the importance of wiping front to back when using the restroom as well as urinating after sex to decrease risk of urinary tract infections  -Ceftriaxone every 24 hours, lactated Ringer's at 100 cc an hour  -Pain meds ordered    Sepsis secondary to acute pyelonephritis  Met criteria on admission with white blood count of 14, tachycardia and now febrile  Patient is been on antibiotics and received cultures prior to antibiotics, no indication for fluid resuscitation given lactic acid below 4.    Acute kidney injury  Baseline of around 0.7-0.8, 1.13 on admission.  Will fluid resuscitate repeat labs in a.m. to monitor trend.    Lactic acidosis  Likely secondary to acute pyelonephritis, initial 2.1 decreased to 1, no need to further trend      I discussed the patient's findings and my recommendations with patient, spouse, nursing staff, and ED provider.    VTE Prophylaxis - SCDs.  Code Status - Full code.    Jayne Raya MD  [facility]

## 2023-12-16 NOTE — LETTER
December 18, 2023     Patient: Rosalina Pardo   YOB: 2000   Date of Visit: 12/16/2023       To Whom It May Concern:    It is my medical opinion that Rosalina Pardo .may return to work on 12/19/2023         Siobhan Mercer RN

## 2023-12-16 NOTE — ED PROVIDER NOTES
Subjective   History of Present Illness  Pt is a 23 y.o. female with PMH as listed who presents for   Chief Complaint   Patient presents with    Flank Pain       Patient is a 23-year-old female who presents for low back pain, fever for the past couple of days.  States that she has had a Tmax of 100.1 and has had right lower back pain for the past couple of days.  Denies any dysuria, increased urinary frequency or urgency.  Denies any abdominal pain, chest pain, shortness of breath.  Has no URI symptoms.  States that she does get recurrent UTIs but denies any of those symptoms currently.  She also has a history of scoliosis with chronic back pain but states that her current back pain is different from what she is experienced previously.  No other new complaints at this time.    Review of Systems    Past Medical History:   Diagnosis Date    ADHD (attention deficit hyperactivity disorder)     Chiari I malformation     Scoliosis        No Known Allergies    Past Surgical History:   Procedure Laterality Date     SECTION N/A 2023    Procedure:  SECTION PRIMARY;  Surgeon: Enriqueta Stokes MD;  Location: [facility] LABOR DELIVERY;  Service: Obstetrics/Gynecology;  Laterality: N/A;    MOUTH SURGERY      TONSILLECTOMY      WISDOM TOOTH EXTRACTION         Family History   Problem Relation Age of Onset    Melanoma Father     Melanoma Mother     Diabetes Maternal Grandmother     Neuropathy Maternal Grandmother     Cancer Maternal Grandfather     Breast cancer Maternal Aunt     Breast cancer Maternal Aunt     Breast cancer Maternal Aunt     Breast cancer Maternal Aunt        Social History     Socioeconomic History    Marital status:     Number of children: 0    Years of education: STUDENT    Highest education level: Not asked   Tobacco Use    Smoking status: Never    Smokeless tobacco: Never   Vaping Use    Vaping Use: Some days    Substances: Nicotine, Flavoring   Substance and Sexual Activity     Alcohol use: Yes     Comment: Occ    Drug use: No    Sexual activity: Defer           Objective   Physical Exam  Constitutional:       Appearance: Normal appearance.   HENT:      Head: Normocephalic and atraumatic.      Mouth/Throat:      Mouth: Mucous membranes are moist.      Pharynx: Oropharynx is clear.   Eyes:      Conjunctiva/sclera: Conjunctivae normal.   Cardiovascular:      Rate and Rhythm: Normal rate and regular rhythm.   Pulmonary:      Effort: Pulmonary effort is normal.   Abdominal:      General: Abdomen is flat.      Palpations: Abdomen is soft.      Tenderness: There is no right CVA tenderness or left CVA tenderness.   Musculoskeletal:      Cervical back: Neck supple.   Skin:     General: Skin is warm and dry.   Neurological:      Mental Status: She is alert.   Psychiatric:         Mood and Affect: Mood normal.         Procedures           ED Course                                             Medical Decision Making  My differential diagnosis for back pain includes but is not limited to:  Musculoskeletal strain, contusion, retroperitoneal hematoma, disc protrusion, vertebral fracture, transverse process fracture, rib fracture, facet syndrome, sacroiliac joint strain, sciatica, renal injury, splenic injury, pancreatic injury, osteoarthritis, lumbar spondylosis, spinal stenosis, ankylosing spondylitis, sacroiliac joint inflammation, pancreatitis, perforated peptic ulcer, diverticulitis, endometriosis, chronic PID, epidural abscess, osteomyelitis, retroperitoneal abscess, pyelonephritis, pneumonia, subphrenic abscess, tuberculosis, neurofibroma, meningioma, multiple myeloma, lymphoma, metastatic cancer, primary cancer, AAA, aortic dissection, spinal ischemia, referred pain, ureterolithiasis      Problems Addressed:  Pyelonephritis: complicated acute illness or injury     Details: Labwork significant for white count of 14, heart rate 110, temperature 100 °F.  Patient is another lactate of 2.1 as well.   Given fluid bolus.  Not full sepsis bolus due to stable BP.  30 mL/kg not indicated at this time.  Patient given Rocephin.  Blood cultures drawn prior to antibiotic administration.  Discussed with hospitalist Dr. Ladarius Elkins patient's workup and plan for admission for IV antibiotics.  She agrees to admit patient.  Spoke with patient regarding plan, she agrees with plan for admission.  All questions answered.    Amount and/or Complexity of Data Reviewed  Labs: ordered.     Details: Labwork significant for lactate of 2.1, white count greater than 14.  Radiology: ordered.     Details: CT on pelvis significant for pyelonephritis  Discussion of management or test interpretation with external provider(s): Spoke with Dr. Raya with hospitalist service who agrees to admit patient for further management of pyelonephritis.    Risk  OTC drugs.  Prescription drug management.  Decision regarding hospitalization.        Final diagnoses:   Pyelonephritis       ED Disposition  ED Disposition       ED Disposition   Decision to Admit    Condition   --    Comment   Level of Care: Med/Surg [1]                 No follow-up provider specified.       Medication List      No changes were made to your prescriptions during this visit.            Abhay Saez MD  12/16/23 6394

## 2023-12-16 NOTE — NURSING NOTE
"..Nursing report ED to floor  Rosalina Pardo  23 y.o.  female    HPI :   Chief Complaint   Patient presents with    Flank Pain       Admitting doctor:   No admitting provider for patient encounter.    Admitting diagnosis:   The encounter diagnosis was Pyelonephritis.    Code status:   Current Code Status       Date Active Code Status Order ID Comments User Context       Prior            Allergies:   Patient has no known allergies.    Isolation:   No active isolations    Intake and Output    Intake/Output Summary (Last 24 hours) at 12/16/2023 1216  Last data filed at 12/16/2023 1215  Gross per 24 hour   Intake 1000 ml   Output --   Net 1000 ml       Weight:       12/16/23  0749   Weight: 93 kg (205 lb)       Most recent vitals:   Vitals:    12/16/23 0749   BP: 114/75   BP Location: Right arm   Patient Position: Sitting   Pulse: 110   Resp: 18   Temp: 100 °F (37.8 °C)   TempSrc: Oral   SpO2: 97%   Weight: 93 kg (205 lb)   Height: 162.6 cm (64\")       Active LDAs/IV Access:   Lines, Drains & Airways       Active LDAs       Name Placement date Placement time Site Days    Peripheral IV 12/16/23 0755 Right Antecubital 12/16/23  0755  Antecubital  less than 1                    Labs (abnormal labs have a star):   Labs Reviewed   URINALYSIS W/ MICROSCOPIC IF INDICATED (NO CULTURE) - Abnormal; Notable for the following components:       Result Value    Blood, UA Trace (*)     Protein, UA 30 mg/dL (1+) (*)     Leuk Esterase, UA Trace (*)     All other components within normal limits   COMPREHENSIVE METABOLIC PANEL - Abnormal; Notable for the following components:    Creatinine 1.13 (*)     All other components within normal limits    Narrative:     GFR Normal >60  Chronic Kidney Disease <60  Kidney Failure <15     CBC WITH AUTO DIFFERENTIAL - Abnormal; Notable for the following components:    WBC 14.09 (*)     Neutrophil % 87.0 (*)     Lymphocyte % 6.0 (*)     Neutrophils, Absolute 12.26 (*)     All other components within " normal limits   URINALYSIS, MICROSCOPIC ONLY - Abnormal; Notable for the following components:    WBC, UA 3-5 (*)     Bacteria, UA 4+ (*)     All other components within normal limits   LACTIC ACID, PLASMA - Abnormal; Notable for the following components:    Lactate 2.1 (*)     All other components within normal limits   PREGNANCY, URINE - Normal   LIPASE - Normal   BLOOD CULTURE   BLOOD CULTURE   LACTIC ACID, REFLEX   CBC AND DIFFERENTIAL    Narrative:     The following orders were created for panel order CBC & Differential.  Procedure                               Abnormality         Status                     ---------                               -----------         ------                     CBC Auto Differential[[id number]]        Abnormal            Final result                 Please view results for these tests on the individual orders.       Results       Procedure Component Value Ref Range Date/Time    Lactic Acid, Plasma [[id number]]  (Abnormal) Collected: 12/16/23 0943    Order Status: Completed Specimen: Blood Updated: 12/16/23 1018     Lactate 2.1 0.5 - 2.0 mmol/L     STAT Lactic Acid, Reflex [[id number]]     Order Status: Sent Specimen: Blood     Blood Culture - Blood, Arm, Right [[id number]] Collected: 12/16/23 0946    Order Status: Sent Specimen: Blood from Arm, Right Updated: 12/16/23 0951    Blood Culture - Blood, Arm, Left [[id number]] Collected: 12/16/23 0943    Order Status: Sent Specimen: Blood from Arm, Left Updated: 12/16/23 0951    Comprehensive Metabolic Panel [[id number]]  (Abnormal) Collected: 12/16/23 0804    Order Status: Completed Specimen: Blood from Arm, Right Updated: 12/16/23 0839     Glucose 99 65 - 99 mg/dL      BUN 8 6 - 20 mg/dL      Creatinine 1.13 0.57 - 1.00 mg/dL      Sodium 137 136 - 145 mmol/L      Potassium 4.3 3.5 - 5.2 mmol/L      Chloride 101 98 - 107 mmol/L      CO2 22.1 22.0 - 29.0 mmol/L      Calcium 9.7 8.6 - 10.5 mg/dL      Total Protein 7.8 6.0 - 8.5 g/dL       Albumin 4.2 3.5 - 5.2 g/dL      ALT (SGPT) 18 1 - 33 U/L      AST (SGOT) 18 1 - 32 U/L      Alkaline Phosphatase 93 39 - 117 U/L      Total Bilirubin 0.5 0.0 - 1.2 mg/dL      Globulin 3.6 gm/dL      A/G Ratio 1.2 g/dL      BUN/Creatinine Ratio 7.1 7.0 - 25.0      Anion Gap 13.9 5.0 - 15.0 mmol/L      eGFR 70.3 >60.0 mL/min/1.73     Narrative:      GFR Normal >60  Chronic Kidney Disease <60  Kidney Failure <15      Lipase [[id number]]  (Normal) Collected: 12/16/23 0804    Order Status: Completed Specimen: Blood from Arm, Right Updated: 12/16/23 0839     Lipase 14 13 - 60 U/L     Urinalysis, Microscopic Only - Urine, Clean Catch [[id number]]  (Abnormal) Collected: 12/16/23 0747    Order Status: Completed Specimen: Urine, Clean Catch Updated: 12/16/23 0838     RBC, UA 0-2 None Seen, 0-2 /HPF      WBC, UA 3-5 None Seen, 0-2 /HPF      Bacteria, UA 4+ None Seen /HPF      Squamous Epithelial Cells, UA 0-2 None Seen, 0-2 /HPF      Hyaline Casts, UA None Seen None Seen /LPF      Methodology Manual Light Microscopy    Pregnancy, Urine - Urine, Clean Catch [[id number]]  (Normal) Collected: 12/16/23 0747    Order Status: Completed Specimen: Urine, Clean Catch Updated: 12/16/23 0827     HCG, Urine QL Negative Negative     Urinalysis With Microscopic If Indicated (No Culture) - Urine, Clean Catch [[id number]]  (Abnormal) Collected: 12/16/23 0747    Order Status: Completed Specimen: Urine, Clean Catch Updated: 12/16/23 0826     Color, UA Yellow Yellow, Straw      Appearance, UA Clear Clear      pH, UA 6.0 4.5 - 8.0      Specific Gravity, UA 1.020 1.003 - 1.030      Glucose, UA Negative Negative      Ketones, UA Negative Negative      Bilirubin, UA Negative Negative      Blood, UA Trace Negative      Protein, UA 30 mg/dL (1+) Negative      Leuk Esterase, UA Trace Negative      Nitrite, UA Negative Negative      Urobilinogen, UA 0.2 E.U./dL 0.2 - 1.0 E.U./dL     CBC Auto Differential [[id number]]  (Abnormal) Collected: 12/16/23 0804     Order Status: Completed Specimen: Blood from Arm, Right Updated: 12/16/23 0820     WBC 14.09 3.40 - 10.80 10*3/mm3      RBC 4.23 3.77 - 5.28 10*6/mm3      Hemoglobin 12.7 12.0 - 15.9 g/dL      Hematocrit 37.5 34.0 - 46.6 %      MCV 88.7 79.0 - 97.0 fL      MCH 30.0 26.6 - 33.0 pg      MCHC 33.9 31.5 - 35.7 g/dL      RDW 12.7 12.3 - 15.4 %      RDW-SD 41.3 37.0 - 54.0 fl      MPV 9.7 6.0 - 12.0 fL      Platelets 373 140 - 450 10*3/mm3      Neutrophil % 87.0 42.7 - 76.0 %      Lymphocyte % 6.0 19.6 - 45.3 %      Monocyte % 5.8 5.0 - 12.0 %      Eosinophil % 0.4 0.3 - 6.2 %      Basophil % 0.4 0.0 - 1.5 %      Immature Grans % 0.4 0.0 - 0.5 %      Neutrophils, Absolute 12.26 1.70 - 7.00 10*3/mm3      Lymphocytes, Absolute 0.84 0.70 - 3.10 10*3/mm3      Monocytes, Absolute 0.82 0.10 - 0.90 10*3/mm3      Eosinophils, Absolute 0.06 0.00 - 0.40 10*3/mm3      Basophils, Absolute 0.06 0.00 - 0.20 10*3/mm3      Immature Grans, Absolute 0.05 0.00 - 0.05 10*3/mm3      nRBC 0.0 0.0 - 0.2 /100 WBC              EKG:   No orders to display       Meds given in ED:   Medications   sodium chloride 0.9 % infusion  - ADS Override Pull (has no administration in time range)   ketorolac (TORADOL) injection 15 mg (15 mg Intravenous Given 12/16/23 0802)   ondansetron (ZOFRAN) injection 4 mg (4 mg Intravenous Given 12/16/23 0800)   cefTRIAXone (ROCEPHIN) 1,000 mg in sodium chloride 0.9 % 100 mL IVPB (0 mg Intravenous Stopped 12/16/23 1036)   cefTRIAXone (ROCEPHIN) 1 g injection  - ADS Override Pull (1,000 mg  Given 12/16/23 1001)   acetaminophen (TYLENOL) tablet 1,000 mg (1,000 mg Oral Given 12/16/23 1000)   morphine injection 2 mg (2 mg Intravenous Given 12/16/23 1032)   lactated ringers bolus 1,000 mL (0 mL Intravenous Stopped 12/16/23 1215)       Imaging results:  CT Abdomen Pelvis Without Contrast    Result Date: 12/16/2023  1. Asymmetric right perinephric edema. Suspect acute pyonephritis. Recommend correlation with urinalysis. 2. No  urolithiasis or hydronephrosis. 3. L5 pars defects Signer Name: Thaddeus Jacob MD Signed: 12/16/2023 9:05 AM EST [facility]     Ambulatory status:   -ambulatory      Social issues:   Social History     Socioeconomic History    Marital status:     Number of children: 0    Years of education: STUDENT    Highest education level: Not asked   Tobacco Use    Smoking status: Never    Smokeless tobacco: Never   Vaping Use    Vaping Use: Some days    Substances: Nicotine, Flavoring   Substance and Sexual Activity    Alcohol use: Yes     Comment: Occ    Drug use: No    Sexual activity: Defer       NIH Stroke Scale:         Jose Valverde RN  12/16/23 12:16 EST

## 2023-12-16 NOTE — PLAN OF CARE
Goal Outcome Evaluation:  Plan of Care Reviewed With: patient, spouse        Progress: improving  Outcome Evaluation: Patient ER admit with pyelonephritis, IV rocephine given in ER. Spiked temperature with increase pain this afternoon, IV fluids continous, PO tylenol & pain medication ordered. Plan home with spouse at discharge.

## 2023-12-17 LAB
ANION GAP SERPL CALCULATED.3IONS-SCNC: 12.9 MMOL/L (ref 5–15)
BASOPHILS # BLD MANUAL: 0.13 10*3/MM3 (ref 0–0.2)
BASOPHILS NFR BLD MANUAL: 1 % (ref 0–1.5)
BUN SERPL-MCNC: 9 MG/DL (ref 6–20)
BUN/CREAT SERPL: 8.1 (ref 7–25)
CALCIUM SPEC-SCNC: 8.5 MG/DL (ref 8.6–10.5)
CHLORIDE SERPL-SCNC: 101 MMOL/L (ref 98–107)
CO2 SERPL-SCNC: 20.1 MMOL/L (ref 22–29)
CREAT SERPL-MCNC: 1.11 MG/DL (ref 0.57–1)
DEPRECATED RDW RBC AUTO: 42 FL (ref 37–54)
EGFRCR SERPLBLD CKD-EPI 2021: 71.8 ML/MIN/1.73
ERYTHROCYTE [DISTWIDTH] IN BLOOD BY AUTOMATED COUNT: 12.7 % (ref 12.3–15.4)
GLUCOSE SERPL-MCNC: 116 MG/DL (ref 65–99)
HCT VFR BLD AUTO: 34.6 % (ref 34–46.6)
HGB BLD-MCNC: 11.4 G/DL (ref 12–15.9)
LYMPHOCYTES # BLD MANUAL: 1.92 10*3/MM3 (ref 0.7–3.1)
LYMPHOCYTES NFR BLD MANUAL: 6 % (ref 5–12)
MCH RBC QN AUTO: 29.9 PG (ref 26.6–33)
MCHC RBC AUTO-ENTMCNC: 32.9 G/DL (ref 31.5–35.7)
MCV RBC AUTO: 90.8 FL (ref 79–97)
MONOCYTES # BLD: 0.77 10*3/MM3 (ref 0.1–0.9)
NEUTROPHILS # BLD AUTO: 9.98 10*3/MM3 (ref 1.7–7)
NEUTROPHILS NFR BLD MANUAL: 78 % (ref 42.7–76)
PLAT MORPH BLD: NORMAL
PLATELET # BLD AUTO: 291 10*3/MM3 (ref 140–450)
PMV BLD AUTO: 10.7 FL (ref 6–12)
POTASSIUM SERPL-SCNC: 3.5 MMOL/L (ref 3.5–5.2)
RBC # BLD AUTO: 3.81 10*6/MM3 (ref 3.77–5.28)
RBC MORPH BLD: NORMAL
SODIUM SERPL-SCNC: 134 MMOL/L (ref 136–145)
VARIANT LYMPHS NFR BLD MANUAL: 15 % (ref 19.6–45.3)
WBC MORPH BLD: NORMAL
WBC NRBC COR # BLD AUTO: 12.8 10*3/MM3 (ref 3.4–10.8)

## 2023-12-17 PROCEDURE — 80048 BASIC METABOLIC PNL TOTAL CA: CPT | Performed by: STUDENT IN AN ORGANIZED HEALTH CARE EDUCATION/TRAINING PROGRAM

## 2023-12-17 PROCEDURE — 99232 SBSQ HOSP IP/OBS MODERATE 35: CPT | Performed by: STUDENT IN AN ORGANIZED HEALTH CARE EDUCATION/TRAINING PROGRAM

## 2023-12-17 PROCEDURE — 25810000003 LACTATED RINGERS PER 1000 ML: Performed by: STUDENT IN AN ORGANIZED HEALTH CARE EDUCATION/TRAINING PROGRAM

## 2023-12-17 PROCEDURE — G0378 HOSPITAL OBSERVATION PER HR: HCPCS

## 2023-12-17 PROCEDURE — 25010000002 CEFTRIAXONE SODIUM-DEXTROSE 2-2.22 GM-%(50ML) RECONSTITUTED SOLUTION: Performed by: STUDENT IN AN ORGANIZED HEALTH CARE EDUCATION/TRAINING PROGRAM

## 2023-12-17 PROCEDURE — 96366 THER/PROPH/DIAG IV INF ADDON: CPT

## 2023-12-17 PROCEDURE — 85027 COMPLETE CBC AUTOMATED: CPT | Performed by: STUDENT IN AN ORGANIZED HEALTH CARE EDUCATION/TRAINING PROGRAM

## 2023-12-17 PROCEDURE — 85007 BL SMEAR W/DIFF WBC COUNT: CPT | Performed by: STUDENT IN AN ORGANIZED HEALTH CARE EDUCATION/TRAINING PROGRAM

## 2023-12-17 PROCEDURE — 96361 HYDRATE IV INFUSION ADD-ON: CPT

## 2023-12-17 RX ORDER — BUTALBITAL, ACETAMINOPHEN AND CAFFEINE 50; 325; 40 MG/1; MG/1; MG/1
1 TABLET ORAL EVERY 4 HOURS PRN
Status: DISCONTINUED | OUTPATIENT
Start: 2023-12-17 | End: 2023-12-18 | Stop reason: HOSPADM

## 2023-12-17 RX ADMIN — SODIUM CHLORIDE, POTASSIUM CHLORIDE, SODIUM LACTATE AND CALCIUM CHLORIDE 100 ML/HR: 600; 310; 30; 20 INJECTION, SOLUTION INTRAVENOUS at 21:38

## 2023-12-17 RX ADMIN — Medication 10 ML: at 21:37

## 2023-12-17 RX ADMIN — ACETAMINOPHEN 1000 MG: 500 TABLET ORAL at 08:57

## 2023-12-17 RX ADMIN — SODIUM CHLORIDE, POTASSIUM CHLORIDE, SODIUM LACTATE AND CALCIUM CHLORIDE 100 ML/HR: 600; 310; 30; 20 INJECTION, SOLUTION INTRAVENOUS at 01:27

## 2023-12-17 RX ADMIN — OXYCODONE HYDROCHLORIDE 5 MG: 5 TABLET ORAL at 18:36

## 2023-12-17 RX ADMIN — BUTALBITAL, ACETAMINOPHEN, AND CAFFEINE 1 TABLET: 325; 50; 40 TABLET ORAL at 21:36

## 2023-12-17 RX ADMIN — OXYCODONE HYDROCHLORIDE 5 MG: 5 TABLET ORAL at 06:55

## 2023-12-17 RX ADMIN — SODIUM CHLORIDE, POTASSIUM CHLORIDE, SODIUM LACTATE AND CALCIUM CHLORIDE 100 ML/HR: 600; 310; 30; 20 INJECTION, SOLUTION INTRAVENOUS at 11:49

## 2023-12-17 RX ADMIN — ACETAMINOPHEN 1000 MG: 500 TABLET ORAL at 17:07

## 2023-12-17 RX ADMIN — DOCUSATE SODIUM 50 MG AND SENNOSIDES 8.6 MG 2 TABLET: 8.6; 5 TABLET, FILM COATED ORAL at 21:36

## 2023-12-17 RX ADMIN — CEFTRIAXONE 2000 MG: 2 INJECTION, SOLUTION INTRAVENOUS at 00:52

## 2023-12-17 NOTE — CASE MANAGEMENT/SOCIAL WORK
Continued Stay Note  ISAAC Valenzuela     Patient Name: Rosalina Pardo  MRN: [id number]  Today's Date: 12/17/2023    Admit Date: 12/16/2023    Plan: DC Home, no needs   Discharge Plan       Row Name 12/17/23 1158       Plan    Plan DC Home, no needs    Plan Comments CCP met with pt, introduced self, role and discusseddc needs.  Pt lives with her spouse in a single story home.  There are 3 steps to enter the front door.  pt is IADL and works full time.  She does not use DME.  She will need a work excuse on dc.  Her pharmacy is [facility] in [place] and she has no issues with copays.  Pt has never been to IP rehab or used HH.  Her plan is to return home.  CCP will follow. Thanks, Namita GRIFFITHS                   Discharge Codes    No documentation.                       Namita Anderson

## 2023-12-17 NOTE — PROGRESS NOTES
[facility] HOSPITALIST      Name: Rosalina Pardo ADMIT: 2023   : 2000  PCP: Vidal Armendariz MD    MRN: [id number] LOS: 0 days   AGE/SEX: 23 y.o. female  ROOM: [facility]   Chief Complaint Pyelonephritis     Subjective   Subjective   Patient endorsing some chills and feverish.  Interested in taking a shower.  Does feel that the back pain is much controlled and improved from previous days.  Family present at bedside, permission given to discuss patient care in front of family.    Review of Systems  As above     Objective   Objective   Vital Signs  Temp:  [97.9 °F (36.6 °C)-100.3 °F (37.9 °C)] 97.9 °F (36.6 °C)  Heart Rate:  [] 76  Resp:  [18] 18  BP: ()/(55-62) 108/56  SpO2:  [95 %-98 %] 96 %  on   ;   Device (Oxygen Therapy): room air  Body mass index is 36.18 kg/m².  Physical Exam  Constitutional:       Appearance: Normal appearance.   HENT:      Head: Normocephalic and atraumatic.      Mouth/Throat:      Mouth: Mucous membranes are moist.      Pharynx: Oropharynx is clear.   Cardiovascular:      Rate and Rhythm: Normal rate and regular rhythm.      Heart sounds: No murmur heard.     No gallop.   Pulmonary:      Effort: Pulmonary effort is normal.      Breath sounds: Normal breath sounds.   Abdominal:      General: Abdomen is flat. Bowel sounds are normal.      Tenderness: There is right CVA tenderness.   Musculoskeletal:         General: No swelling or deformity.   Skin:     General: Skin is warm and dry.   Neurological:      General: No focal deficit present.      Mental Status: She is alert and oriented to person, place, and time.         Results Review     I reviewed the patient's new clinical results.  Results from last 7 days   Lab Units 23  0430 23  0804   WBC 10*3/mm3 12.80* 14.09*   HEMOGLOBIN g/dL 11.4* 12.7   PLATELETS 10*3/mm3 291 373     Results from last 7 days   Lab Units 23  0430 23  0804   SODIUM mmol/L 134* 137   POTASSIUM mmol/L  "3.5 4.3   CHLORIDE mmol/L 101 101   CO2 mmol/L 20.1* 22.1   BUN mg/dL 9 8   CREATININE mg/dL 1.11* 1.13*   GLUCOSE mg/dL 116* 99   Estimated Creatinine Clearance: 88.5 mL/min (A) (by C-G formula based on SCr of 1.11 mg/dL (H)).  Results from last 7 days   Lab Units 12/16/23  0804   ALBUMIN g/dL 4.2   BILIRUBIN mg/dL 0.5   ALK PHOS U/L 93   AST (SGOT) U/L 18   ALT (SGPT) U/L 18     Results from last 7 days   Lab Units 12/17/23  0430 12/16/23  0804   CALCIUM mg/dL 8.5* 9.7   ALBUMIN g/dL  --  4.2     Results from last 7 days   Lab Units 12/16/23  1237 12/16/23  0943   LACTATE mmol/L 1.0 2.1*     COVID19   Date Value Ref Range Status   07/01/2021 Not Detected Not Detected - Ref. Range Final     No results found for: \"HGBA1C\", \"POCGLU\"    CT Abdomen Pelvis Without Contrast  Narrative: INDICATION: Right lower back pain    COMPARISON: None    TECHNIQUE:   Routine CT abdomen and pelvis without IV contrast. Coronal and sagittal reformats. Radiation dose reduction techniques included automated exposure control or exposure modulation based on body size. Radiation audit for number of CT and nuclear cardiology  exams performed in the last year: 0.      FINDINGS:     Lung bases: Small amount of subsegmental atelectasis at the bases.    Abdomen: Normal liver, gallbladder, spleen, pancreas and adrenal glands. Asymmetric right perinephric edema. No urolithiasis. No hydronephrosis.    Pelvis: Underdistended bladder. No bladder calculus. Anteverted uterus. No adnexal mass. Dominant follicle on the right ovary.    Bowel: No obstruction. Normal appendix.    Abdominal wall: Pelvic wall scarring.    Retroperitoneum: No lymphadenopathy.    Vasculature: No abdominal aortic aneurysm. Circumaortic left renal vein.    Osseous structures: Bilateral L5 pars defects.  Impression: 1. Asymmetric right perinephric edema. Suspect acute pyonephritis. Recommend correlation with urinalysis.  2. No urolithiasis or hydronephrosis.  3. L5 pars " defects    Signer Name: Thaddeus Jacob MD   Signed: 12/16/2023 9:05 AM EST  [facility]    I reviewed the patient's daily medications.  Scheduled Medications  cefTRIAXone, 2,000 mg, Intravenous, Q24H  senna-docusate sodium, 2 tablet, Oral, BID  sodium chloride, 10 mL, Intravenous, Q12H    Infusions  lactated ringers, 100 mL/hr, Last Rate: 100 mL/hr (12/17/23 1149)    Diet  Diet: Regular/House Diet; Texture: Regular Texture (IDDSI 7); Fluid Consistency: Thin (IDDSI 0)       Assessment/Plan     Active Hospital Problems    Diagnosis  POA    **Pyelonephritis [N12]  Yes      Resolved Hospital Problems   No resolved problems to display.       23 y.o. female admitted with Pyelonephritis.    Acute pyelonephritis  UA consistent with UTI, CT imaging demonstrating pyelonephritis, urine culture pending. 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      Sepsis secondary to acute pyelonephritis, resolving  Met criteria on admission with white blood count of 14, tachycardia and now febrile  Patient is been on antibiotics and received cultures prior to antibiotics.     Acute kidney injury, resolving  Baseline of around 0.7-0.8, 1.13 on admission.  Continue IV fluid resuscitation at this time    Lactic acidosis, resolved  Likely secondary to acute pyelonephritis, initial 2.1 decreased to 1, no need to further trend    SCDs for DVT prophylaxis.  Full code.  Discussed with patient, family, CCP, and care team on multidisciplinary rounds.  Anticipate discharge home in 1-2 days.      Jayne Raya MD  [facility] HOSPITALIST  12/17/23  16:35 EST

## 2023-12-17 NOTE — DISCHARGE PLACEMENT REQUEST
"Rosalina Pardo (23 y.o. Female)       Date of Birth   2000    Social Security Number       Address   [address] [place], [place] [address] [id number]    Home Phone   [phone]    MRN   [id number]       Zoroastrianism   Patient Refused    Marital Status                               Admission Date   12/16/23    Admission Type   Emergency    Admitting Provider   Jayne Raya MD    Attending Provider   Jayne Raya MD    Department, Room/Bed   [facility] MED SURG, 1421/1       Discharge Date       Discharge Disposition       Discharge Destination                                 Attending Provider: Jayne Raya MD    Allergies: No Known Allergies    Isolation: None   Infection: None   Code Status: CPR    Ht: 162.6 cm (64\")   Wt: 95.6 kg (210 lb 12.2 oz)    Admission Cmt: None   Principal Problem: Pyelonephritis [N12]                   Active Insurance as of 12/16/2023       Primary Coverage       Payor Plan Insurance Group Employer/Plan Group    Critical access hospital BLUE [employer] [facility] [employer] EMPLOYEE [id number]       Payor Plan Address Payor Plan Phone Number Payor Plan Fax Number Effective Dates    [address] [phone]  1/1/2023 - None Entered    [facility] [id number]         Subscriber Name Subscriber Birth Date Member ID       ROSALINA PARDO 2000 [id number]                     Emergency Contacts        (Rel.) Home Phone Work Phone Mobile Phone    Sami Pardo (Spouse) [phone] -- --    KennyWinnie (Mother) [phone] -- --                "

## 2023-12-17 NOTE — PLAN OF CARE
Goal Outcome Evaluation:  Plan of Care Reviewed With: patient, spouse        Progress: improving  Outcome Evaluation: Patient here for pylenophritis, IV fluids continous today, IV rocephine daily. Tylenol twice today. Patient feeling better. Awaiting urine cultures. Family at bedside most of the day today. Anticipate home soon

## 2023-12-17 NOTE — PLAN OF CARE
"Goal Outcome Evaluation:  Plan of Care Reviewed With: patient        Progress: improving  Outcome Evaluation: Patient VSS stable. PRN medication given for pain, pt reported, \" Fever broke\" and doing better. Voiding and ambulating independently.         "

## 2023-12-18 ENCOUNTER — READMISSION MANAGEMENT (OUTPATIENT)
Dept: CALL CENTER | Facility: HOSPITAL | Age: 23
End: 2023-12-18
Payer: COMMERCIAL

## 2023-12-18 VITALS
WEIGHT: 210.76 LBS | DIASTOLIC BLOOD PRESSURE: 63 MMHG | RESPIRATION RATE: 17 BRPM | HEIGHT: 64 IN | BODY MASS INDEX: 35.98 KG/M2 | SYSTOLIC BLOOD PRESSURE: 116 MMHG | OXYGEN SATURATION: 96 % | HEART RATE: 68 BPM | TEMPERATURE: 98.5 F

## 2023-12-18 LAB
ANION GAP SERPL CALCULATED.3IONS-SCNC: 6.7 MMOL/L (ref 5–15)
BASOPHILS # BLD AUTO: 0.07 10*3/MM3 (ref 0–0.2)
BASOPHILS NFR BLD AUTO: 0.6 % (ref 0–1.5)
BUN SERPL-MCNC: 5 MG/DL (ref 6–20)
BUN/CREAT SERPL: 4.6 (ref 7–25)
CALCIUM SPEC-SCNC: 8.8 MG/DL (ref 8.6–10.5)
CHLORIDE SERPL-SCNC: 100 MMOL/L (ref 98–107)
CO2 SERPL-SCNC: 26.3 MMOL/L (ref 22–29)
CREAT SERPL-MCNC: 1.08 MG/DL (ref 0.57–1)
DEPRECATED RDW RBC AUTO: 40.8 FL (ref 37–54)
EGFRCR SERPLBLD CKD-EPI 2021: 74.2 ML/MIN/1.73
EOSINOPHIL # BLD AUTO: 0.22 10*3/MM3 (ref 0–0.4)
EOSINOPHIL NFR BLD AUTO: 2 % (ref 0.3–6.2)
ERYTHROCYTE [DISTWIDTH] IN BLOOD BY AUTOMATED COUNT: 12.4 % (ref 12.3–15.4)
GLUCOSE SERPL-MCNC: 112 MG/DL (ref 65–99)
HCT VFR BLD AUTO: 33.4 % (ref 34–46.6)
HGB BLD-MCNC: 11.1 G/DL (ref 12–15.9)
IMM GRANULOCYTES # BLD AUTO: 0.04 10*3/MM3 (ref 0–0.05)
IMM GRANULOCYTES NFR BLD AUTO: 0.4 % (ref 0–0.5)
LYMPHOCYTES # BLD AUTO: 1.69 10*3/MM3 (ref 0.7–3.1)
LYMPHOCYTES NFR BLD AUTO: 15.4 % (ref 19.6–45.3)
MCH RBC QN AUTO: 29.6 PG (ref 26.6–33)
MCHC RBC AUTO-ENTMCNC: 33.2 G/DL (ref 31.5–35.7)
MCV RBC AUTO: 89.1 FL (ref 79–97)
MONOCYTES # BLD AUTO: 0.86 10*3/MM3 (ref 0.1–0.9)
MONOCYTES NFR BLD AUTO: 7.8 % (ref 5–12)
NEUTROPHILS NFR BLD AUTO: 73.8 % (ref 42.7–76)
NEUTROPHILS NFR BLD AUTO: 8.11 10*3/MM3 (ref 1.7–7)
NRBC BLD AUTO-RTO: 0 /100 WBC (ref 0–0.2)
PLATELET # BLD AUTO: 287 10*3/MM3 (ref 140–450)
PMV BLD AUTO: 9.6 FL (ref 6–12)
POTASSIUM SERPL-SCNC: 3.8 MMOL/L (ref 3.5–5.2)
RBC # BLD AUTO: 3.75 10*6/MM3 (ref 3.77–5.28)
SODIUM SERPL-SCNC: 133 MMOL/L (ref 136–145)
WBC NRBC COR # BLD AUTO: 10.99 10*3/MM3 (ref 3.4–10.8)

## 2023-12-18 PROCEDURE — 99238 HOSP IP/OBS DSCHRG MGMT 30/<: CPT | Performed by: INTERNAL MEDICINE

## 2023-12-18 PROCEDURE — 25010000002 CEFTRIAXONE SODIUM-DEXTROSE 2-2.22 GM-%(50ML) RECONSTITUTED SOLUTION: Performed by: STUDENT IN AN ORGANIZED HEALTH CARE EDUCATION/TRAINING PROGRAM

## 2023-12-18 PROCEDURE — 80048 BASIC METABOLIC PNL TOTAL CA: CPT | Performed by: STUDENT IN AN ORGANIZED HEALTH CARE EDUCATION/TRAINING PROGRAM

## 2023-12-18 PROCEDURE — 85025 COMPLETE CBC W/AUTO DIFF WBC: CPT | Performed by: STUDENT IN AN ORGANIZED HEALTH CARE EDUCATION/TRAINING PROGRAM

## 2023-12-18 PROCEDURE — 96366 THER/PROPH/DIAG IV INF ADDON: CPT

## 2023-12-18 PROCEDURE — G0378 HOSPITAL OBSERVATION PER HR: HCPCS

## 2023-12-18 RX ORDER — CEFDINIR 300 MG/1
300 CAPSULE ORAL 2 TIMES DAILY
Qty: 14 CAPSULE | Refills: 0 | Status: SHIPPED | OUTPATIENT
Start: 2023-12-18 | End: 2023-12-27

## 2023-12-18 RX ADMIN — CEFTRIAXONE 2000 MG: 2 INJECTION, SOLUTION INTRAVENOUS at 00:28

## 2023-12-18 RX ADMIN — ACETAMINOPHEN 1000 MG: 500 TABLET ORAL at 04:44

## 2023-12-18 RX ADMIN — DOCUSATE SODIUM 50 MG AND SENNOSIDES 8.6 MG 2 TABLET: 8.6; 5 TABLET, FILM COATED ORAL at 08:58

## 2023-12-18 RX ADMIN — ACETAMINOPHEN 1000 MG: 500 TABLET ORAL at 12:45

## 2023-12-18 NOTE — OUTREACH NOTE
Prep Survey      Flowsheet Row Responses   [facility] patient discharged from? [place]   Is LACE score < 7 ? Yes   Eligibility [facility]   Date of Admission 12/16/23   Date of Discharge 12/18/23   Discharge Disposition Home or Self Care   Discharge diagnosis Pyelonephritis   Does the patient have one of the following disease processes/diagnoses(primary or secondary)? Other   Does the patient have Home health ordered? No   Is there a DME ordered? No   Prep survey completed? Yes            JONAS A - Registered Nurse

## 2023-12-18 NOTE — DISCHARGE SUMMARY
[facility] HOSPITALIST   DISCHARGE SUMMARY      Name:  Rosalina Pardo   Age:  23 y.o.  Sex:  female  :  2000  MRN:  [id number]   Visit Number:  [id number]    Admission Date:  2023  Date of Discharge:  2023  Primary Care Physician:  Vidal Armendariz MD    Important issues to note:    Follow-up with PCP in 1 week.  Return to hospital if worsening symptoms  Increase in fluid intake at home    Discharge Diagnoses:     Acute pyelonephritis, present on admission  Sepsis secondary to acute pyelonephritis, resolved  Acute kidney injury, improving  \Lactic acidosis, resolved.    Problem List:     Active Hospital Problems    Diagnosis  POA    **Pyelonephritis [N12]  Yes      Resolved Hospital Problems   No resolved problems to display.     Presenting Problem:    Chief Complaint   Patient presents with    Flank Pain      Consults:     Consulting Physician(s)         Provider   Role Specialty     Trav Steve DO  Consulting Physician Hospitalist          Procedures Performed:        History of presenting illness/Hospital Course:    Patient with 4-5 urinary tract infections a year comes in with a urinary tract infection.  CT showed right-sided  pyelonephritis, no hydro noted.  Patient was then pain when she came in.  She was treated with IV fluids and pain medicines as well as Rocephin.  She had mild renal insufficiency, this has improved.  Cultures growing out gram-negative bacilli.  Patient would like to go home as she has a small infant at home.    She currently denies any chest pressure, shortness of breath, nausea, vomiting, pain.  Her WBC count has improved on Rocephin, so suspect urinary tract infection will work with cephalosporin.  Will place on Omnicef 300 mg twice daily for 1 week.  Advised to drink lots of fluids.  Advised to follow-up with PCP.  Return to hospital if worsening pain.  She otherwise is stable.        Vital Signs:    Temp:  [97.9 °F (36.6 °C)-99.3 °F (37.4  °C)] 98.5 °F (36.9 °C)  Heart Rate:  [68-93] 68  Resp:  [17-18] 17  BP: (108-116)/(56-74) 116/63    Physical Exam:    General Appearance:  Alert and cooperative.  No acute distress   Head:  Atraumatic and normocephalic.   Eyes: Conjunctivae and sclerae normal, no icterus. No pallor.   Ears:  Ears with no abnormalities noted.   Throat: No oral lesions, no thrush, oral mucosa moist.   Neck: Supple, trachea midline, no thyromegaly.   Back:   No kyphoscoliosis present. No tenderness to palpation.   Lungs:   Breath sounds heard bilaterally equally.  No crackles or wheezing. No Pleural rub or bronchial breathing.   Heart:  Normal S1 and S2, no murmur, no gallop, no rub. No JVD.   Abdomen:   Normal bowel sounds, no masses, no organomegaly. Soft, nontender, nondistended, no rebound tenderness.   Extremities: Supple, no edema, no cyanosis, no clubbing.   Pulses: Pulses palpable bilaterally.   Skin: No bleeding or rash.   Neurologic: Alert and oriented x 3. No facial asymmetry. Moves all four limbs. No tremors.     Pertinent Lab Results:     Results from last 7 days   Lab Units 12/18/23  0625 12/17/23  0430 12/16/23  0804   SODIUM mmol/L 133* 134* 137   POTASSIUM mmol/L 3.8 3.5 4.3   CHLORIDE mmol/L 100 101 101   CO2 mmol/L 26.3 20.1* 22.1   BUN mg/dL 5* 9 8   CREATININE mg/dL 1.08* 1.11* 1.13*   CALCIUM mg/dL 8.8 8.5* 9.7   BILIRUBIN mg/dL  --   --  0.5   ALK PHOS U/L  --   --  93   ALT (SGPT) U/L  --   --  18   AST (SGOT) U/L  --   --  18   GLUCOSE mg/dL 112* 116* 99     Results from last 7 days   Lab Units 12/18/23  0625 12/17/23  0430 12/16/23  0804   WBC 10*3/mm3 10.99* 12.80* 14.09*   HEMOGLOBIN g/dL 11.1* 11.4* 12.7   HEMATOCRIT % 33.4* 34.6 37.5   PLATELETS 10*3/mm3 287 291 373                     Results from last 7 days   Lab Units 12/16/23  0804   LIPASE U/L 14         Results from last 7 days   Lab Units 12/16/23  0946 12/16/23  0943 12/16/23  0747   BLOODCX  No growth at 2 days No growth at 2 days  --    URINECX    --   --  >100,000 CFU/mL Gram Negative Bacilli*       Pertinent Radiology Results:    Imaging Results (All)       Procedure Component Value Units Date/Time    CT Abdomen Pelvis Without Contrast [[id number]] Collected: 12/16/23 0854     Updated: 12/16/23 0907    Narrative:          INDICATION: Right lower back pain    COMPARISON: None    TECHNIQUE:   Routine CT abdomen and pelvis without IV contrast. Coronal and sagittal reformats. Radiation dose reduction techniques included automated exposure control or exposure modulation based on body size. Radiation audit for number of CT and nuclear cardiology  exams performed in the last year: 0.      FINDINGS:     Lung bases: Small amount of subsegmental atelectasis at the bases.    Abdomen: Normal liver, gallbladder, spleen, pancreas and adrenal glands. Asymmetric right perinephric edema. No urolithiasis. No hydronephrosis.    Pelvis: Underdistended bladder. No bladder calculus. Anteverted uterus. No adnexal mass. Dominant follicle on the right ovary.    Bowel: No obstruction. Normal appendix.    Abdominal wall: Pelvic wall scarring.    Retroperitoneum: No lymphadenopathy.    Vasculature: No abdominal aortic aneurysm. Circumaortic left renal vein.    Osseous structures: Bilateral L5 pars defects.      Impression:        1. Asymmetric right perinephric edema. Suspect acute pyonephritis. Recommend correlation with urinalysis.  2. No urolithiasis or hydronephrosis.  3. L5 pars defects    Signer Name: Thaddeus Jacob MD   Signed: 12/16/2023 9:05 AM [facility]  [facility]            Echo:      Condition on Discharge:      Stable.    Code status during the hospital stay:    Code Status and Medical Interventions:   Ordered at: 12/16/23 1649     Code Status (Patient has no pulse and is not breathing):    CPR (Attempt to Resuscitate)     Medical Interventions (Patient has pulse or is breathing):    Full Support     Discharge Disposition:    Home or Self  Care    Discharge Medications:       Discharge Medications        New Medications        Instructions Start Date   cefdinir 300 MG capsule  Commonly known as: OMNICEF   300 mg, Oral, 2 Times Daily             Continue These Medications        Instructions Start Date   buPROPion  MG 24 hr tablet  Commonly known as: Wellbutrin XL   150 mg, Oral, Every Morning      lisdexamfetamine dimesylate 10 MG capsule  Commonly known as: Vyvanse   Take 1 capsule by mouth Every Morning.      norgestimate-ethinyl estradiol 0.25-35 MG-MCG per tablet  Commonly known as: Sprintec 28   1 tablet, Oral, Daily             Discharge Diet:     Diet Instructions       Diet: Regular/House Diet; Thin (IDDSI 0)      Discharge Diet: Regular/House Diet    Fluid Consistency: Thin (IDDSI 0)          Activity at Discharge:     Activity Instructions       Activity as Tolerated            Follow-up Appointments:     Follow-up Information       Vidal Armendariz MD Follow up in 1 week(s).    Specialty: Internal Medicine  Contact information:  [address] [address] [phone]                           Future Appointments   Date Time Provider Department Center   12/27/2023  4:00 PM Vidal Armendariz MD [facility]   9/11/2024  3:15 PM Akila Miranda MD [facility]     Test Results Pending at Discharge:    Pending Labs       Order Current Status    Blood Culture - Blood, Arm, Left Preliminary result    Blood Culture - Blood, Arm, Right Preliminary result    Urine Culture - Urine, Urine, Clean Catch Preliminary result               Mayur Castano DO  12/18/23  12:37 EST    Time: I spent 25 minutes on this discharge activity which included: face-to-face encounter with the patient, reviewing the data in the system, coordination of the care with the nursing staff as well as consultants, documentation, and entering orders.     Dictated utilizing Dragon dictation.

## 2023-12-18 NOTE — PLAN OF CARE
"Goal Outcome Evaluation:  Plan of Care Reviewed With: patient, spouse        Progress: no change  Outcome Evaluation: Pt vital signs stable. Fever not present during the night. Patient had c/o of \"Head throbbing.\" PRN Medication given, deemed effective, per patient. Dressing on IV changed, due to leakage. Patient prefers that if IV becomes unusable that we do not put another one in. Education given on importance of ambulating during the day.         "

## 2023-12-18 NOTE — DISCHARGE INSTR - APPOINTMENTS
Patient needs to call and make a hospital follow up with Dr. Vidal Armendariz within 1-2 weeks of discharge. [phone]

## 2023-12-18 NOTE — CASE MANAGEMENT/SOCIAL WORK
Case Management Discharge Note      Final Note: dc home         Selected Continued Care - Discharged on 12/18/2023 Admission date: 12/16/2023 - Discharge disposition: Home or Self Care      Destination    No services have been selected for the patient.                Durable Medical Equipment    No services have been selected for the patient.                Dialysis/Infusion    No services have been selected for the patient.                Home Medical Care    No services have been selected for the patient.                Therapy    No services have been selected for the patient.                Community Resources    No services have been selected for the patient.                Community & DME    No services have been selected for the patient.                         Final Discharge Disposition Code: 01 - home or self-care

## 2023-12-18 NOTE — DISCHARGE INSTRUCTIONS
Follow-up with your PCP in 1 week.  Will call you if there is any need for change in antibiotics.  Increase fluid intake at home.

## 2023-12-19 ENCOUNTER — TRANSITIONAL CARE MANAGEMENT TELEPHONE ENCOUNTER (OUTPATIENT)
Dept: CALL CENTER | Facility: HOSPITAL | Age: 23
End: 2023-12-19
Payer: COMMERCIAL

## 2023-12-19 LAB — BACTERIA SPEC AEROBE CULT: ABNORMAL

## 2023-12-19 NOTE — OUTREACH NOTE
Call Center TCM Note      Flowsheet Row Responses   [facility], [place], operated by [facility] patient discharged from? [place]   Does the patient have one of the following disease processes/diagnoses(primary or secondary)? Other   TCM attempt successful? No   Unsuccessful attempts Attempt 2            Asif Guzman RN    12/19/2023, 11:28 EST

## 2023-12-20 ENCOUNTER — TRANSITIONAL CARE MANAGEMENT TELEPHONE ENCOUNTER (OUTPATIENT)
Dept: CALL CENTER | Facility: HOSPITAL | Age: 23
End: 2023-12-20
Payer: COMMERCIAL

## 2023-12-20 NOTE — OUTREACH NOTE
Call Center TCM Note      Flowsheet Row Responses   [facility] facility patient discharged from? [place]   Does the patient have one of the following disease processes/diagnoses(primary or secondary)? Other   TCM attempt successful? No   Unsuccessful attempts Attempt 3            Renae Mendez RN    12/20/2023, 09:37 EST

## 2023-12-21 LAB
BACTERIA SPEC AEROBE CULT: NORMAL
BACTERIA SPEC AEROBE CULT: NORMAL

## 2023-12-27 ENCOUNTER — OFFICE VISIT (OUTPATIENT)
Dept: INTERNAL MEDICINE | Facility: CLINIC | Age: 23
End: 2023-12-27
Payer: COMMERCIAL

## 2023-12-27 VITALS
HEIGHT: 64 IN | WEIGHT: 207.2 LBS | OXYGEN SATURATION: 96 % | BODY MASS INDEX: 35.37 KG/M2 | SYSTOLIC BLOOD PRESSURE: 110 MMHG | DIASTOLIC BLOOD PRESSURE: 72 MMHG | HEART RATE: 71 BPM | TEMPERATURE: 97.8 F

## 2023-12-27 DIAGNOSIS — F90.9 ATTENTION DEFICIT HYPERACTIVITY DISORDER (ADHD), UNSPECIFIED ADHD TYPE: ICD-10-CM

## 2023-12-27 RX ORDER — LISDEXAMFETAMINE DIMESYLATE CAPSULES 20 MG/1
20 CAPSULE ORAL EVERY MORNING
Qty: 30 CAPSULE | Refills: 0 | Status: SHIPPED | OUTPATIENT
Start: 2023-12-27

## 2023-12-27 NOTE — PROGRESS NOTES
"Chief Complaint  No chief complaint on file.    Emmy Pardo presents to [facility] PRIMARY [facility]ARE  History of Present Illness  #HDF  Recently admitted with sepsis secondary to pyelonephritis, treated with antibiotics and improved.  Admitted on 12/16 discharged on 12/18.  Completed course of antibiotics yesterday.  Symptoms have improved no recurrent back pain no more fevers.    #Mood  Tolerating Wellbutrin without issue feels like her mood symptoms are significantly improved on this regimen.    #ADHD  Tolerating the 10 mg of Vyvanse with feels like it is not sufficient for would like to increase her dose.  Will increase dose to 20 and have patient return to clinic in 2 months for repeat labs and medication adjustment    Objective   Vital Signs:  /72   Pulse 71   Temp 97.8 °F (36.6 °C) (Oral)   Ht 162.6 cm (64\")   Wt 94 kg (207 lb 3.2 oz)   SpO2 96%   BMI 35.57 kg/m²   Estimated body mass index is 35.57 kg/m² as calculated from the following:    Height as of this encounter: 162.6 cm (64\").    Weight as of this encounter: 94 kg (207 lb 3.2 oz).             Physical Exam  Constitutional:       Appearance: Normal appearance. She is obese.   HENT:      Head: Atraumatic.      Mouth/Throat:      Mouth: Mucous membranes are moist.      Pharynx: Oropharynx is clear.   Eyes:      Extraocular Movements: Extraocular movements intact.   Pulmonary:      Effort: Pulmonary effort is normal.   Abdominal:      General: Abdomen is flat.      Palpations: Abdomen is soft.      Tenderness: There is no abdominal tenderness.   Skin:     Capillary Refill: Capillary refill takes less than 2 seconds.   Neurological:      General: No focal deficit present.      Mental Status: She is alert and oriented to person, place, and time.   Psychiatric:         Mood and Affect: Mood normal.         Behavior: Behavior normal.        Result Review :  The following data was reviewed by: David Armendariz, " MD on 12/27/2023:  CMP          12/16/2023    08:04 12/17/2023    04:30 12/18/2023    06:25   CMP   Glucose 99  116  112    BUN 8  9  5    Creatinine 1.13  1.11  1.08    EGFR 70.3  71.8  74.2    Sodium 137  134  133    Potassium 4.3  3.5  3.8    Chloride 101  101  100    Calcium 9.7  8.5  8.8    Total Protein 7.8      Albumin 4.2      Globulin 3.6      Total Bilirubin 0.5      Alkaline Phosphatase 93      AST (SGOT) 18      ALT (SGPT) 18      Albumin/Globulin Ratio 1.2      BUN/Creatinine Ratio 7.1  8.1  4.6    Anion Gap 13.9  12.9  6.7      CBC          12/16/2023    08:04 12/17/2023    04:30 12/18/2023    06:25   CBC   WBC 14.09  12.80  10.99    RBC 4.23  3.81  3.75    Hemoglobin 12.7  11.4  11.1    Hematocrit 37.5  34.6  33.4    MCV 88.7  90.8  89.1    MCH 30.0  29.9  29.6    MCHC 33.9  32.9  33.2    RDW 12.7  12.7  12.4    Platelets 373  291  287      CBC w/diff          12/16/2023    08:04 12/17/2023    04:30 12/18/2023    06:25   CBC w/Diff   WBC 14.09  12.80  10.99    RBC 4.23  3.81  3.75    Hemoglobin 12.7  11.4  11.1    Hematocrit 37.5  34.6  33.4    MCV 88.7  90.8  89.1    MCH 30.0  29.9  29.6    MCHC 33.9  32.9  33.2    RDW 12.7  12.7  12.4    Platelets 373  291  287    Neutrophil Rel % 87.0   73.8    Immature Granulocyte Rel % 0.4   0.4    Lymphocyte Rel % 6.0   15.4    Monocyte Rel % 5.8   7.8    Eosinophil Rel % 0.4   2.0    Basophil Rel % 0.4   0.6        UA          12/16/2023    07:47   Urinalysis   Squamous Epithelial Cells, UA 0-2    Specific Gravity, UA 1.020    Ketones, UA Negative    Blood, UA Trace    Leukocytes, UA Trace    Nitrite, UA Negative    RBC, UA 0-2    WBC, UA 3-5    Bacteria, UA 4+      Urine Culture          5/26/2023    13:43 8/2/2023    14:42 12/16/2023    07:47   Urine Culture   Urine Culture Final report  Final report  >100,000 CFU/mL Escherichia coli                   Assessment and Plan   Diagnoses and all orders for this visit:    1. Attention deficit hyperactivity disorder  (ADHD), unspecified ADHD type  -     lisdexamfetamine (Vyvanse) 20 MG capsule; Take 1 capsule by mouth Every Morning  Dispense: 30 capsule; Refill: 0      MsZachery Pardo is here today for follow-up of her ADHD, postpartum depression, and hospital discharge follow-up in the setting of her recent admission for pyelonephritis.  From a pyelonephritis standpoint her symptoms completely resolved following treatment In hospital, and has completed course of antibiotics as an outpatient.  At this point this condition has resolved although initial presentation to hospital complicated by sepsis and elevated lactate that improved with fluids.  Now back to baseline.  From an ADHD standpoint we will increase her Vyvanse to 20 mg daily, return to clinic in 6 to 8 weeks.  From a mood standpoint stable on Wellbutrin we will continue at this dose.  Otherwise return to clinic for routine care in 2 months will obtain labs and shots at that time.       Follow Up   Return in about 2 months (around 2/27/2024).  Patient was given instructions and counseling regarding her condition or for health maintenance advice. Please see specific information pulled into the AVS if appropriate.

## 2024-01-11 ENCOUNTER — PATIENT MESSAGE (OUTPATIENT)
Dept: INTERNAL MEDICINE | Facility: CLINIC | Age: 24
End: 2024-01-11
Payer: COMMERCIAL

## 2024-01-11 DIAGNOSIS — F90.9 ATTENTION DEFICIT HYPERACTIVITY DISORDER (ADHD), UNSPECIFIED ADHD TYPE: ICD-10-CM

## 2024-01-11 RX ORDER — LISDEXAMFETAMINE DIMESYLATE CAPSULES 20 MG/1
20 CAPSULE ORAL EVERY MORNING
Qty: 30 CAPSULE | Refills: 0 | Status: SHIPPED | OUTPATIENT
Start: 2024-01-11
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
## 2024-01-16 ENCOUNTER — TELEPHONE (OUTPATIENT)
Dept: INTERNAL MEDICINE | Facility: CLINIC | Age: 24
End: 2024-01-16
Payer: COMMERCIAL

## 2024-01-16 NOTE — TELEPHONE ENCOUNTER
Lmtcb  reg: medical records-  wanted to know if pt wanted to  medical records - if so call the office  and ask for Laurie

## 2024-01-22 RX ORDER — BUPROPION HYDROCHLORIDE 150 MG/1
150 TABLET ORAL EVERY MORNING
Qty: 30 TABLET | Refills: 2 | Status: SHIPPED | OUTPATIENT
Start: 2024-01-22

## 2024-02-28 ENCOUNTER — OFFICE VISIT (OUTPATIENT)
Dept: INTERNAL MEDICINE | Facility: CLINIC | Age: 24
End: 2024-02-28
Payer: COMMERCIAL

## 2024-02-28 VITALS
BODY MASS INDEX: 33.26 KG/M2 | HEART RATE: 61 BPM | WEIGHT: 194.8 LBS | SYSTOLIC BLOOD PRESSURE: 116 MMHG | DIASTOLIC BLOOD PRESSURE: 70 MMHG | HEIGHT: 64 IN | TEMPERATURE: 97.1 F | OXYGEN SATURATION: 97 %

## 2024-02-28 DIAGNOSIS — R63.4 WEIGHT LOSS: ICD-10-CM

## 2024-02-28 DIAGNOSIS — F90.9 ATTENTION DEFICIT HYPERACTIVITY DISORDER (ADHD), UNSPECIFIED ADHD TYPE: Primary | ICD-10-CM

## 2024-02-28 DIAGNOSIS — Z00.00 HEALTHCARE MAINTENANCE: ICD-10-CM

## 2024-02-28 RX ORDER — VENLAFAXINE 50 MG/1
50 TABLET ORAL 2 TIMES DAILY
Qty: 30 TABLET | Refills: 1 | Status: SHIPPED | OUTPATIENT
Start: 2024-02-28

## 2024-02-28 RX ORDER — ESCITALOPRAM OXALATE 20 MG/1
20 TABLET ORAL DAILY
Qty: 30 TABLET | Refills: 2 | Status: SHIPPED | OUTPATIENT
Start: 2024-02-28 | End: 2024-02-28

## 2024-02-28 NOTE — PROGRESS NOTES
"Chief Complaint  No chief complaint on file.    Subjective        Rosalina Pardo presents to [facility]  History of Present Illness  #ADHD  Tolerating Vyvanse 20 without issue, doing well.  Will provide urine sample, controlled substances agreement today.    #Postpartum depression  On Wellbutrin 150, not feeling like is having as much effect as it was previously.  Prior to her pregnancy was on Lexapro 20, never really felt like that was making much of a difference either.  Concerned about weight gain, previously struggled with an eating disorder would like to avoid gaining enough weight that she has problems with that.  Objective   Vital Signs:  /70   Pulse 61   Temp 97.1 °F (36.2 °C) (Temporal)   Ht 162.6 cm (64\")   Wt 88.4 kg (194 lb 12.8 oz)   SpO2 97%   BMI 33.44 kg/m²   Estimated body mass index is 33.44 kg/m² as calculated from the following:    Height as of this encounter: 162.6 cm (64\").    Weight as of this encounter: 88.4 kg (194 lb 12.8 oz).             Physical Exam  Constitutional:       Appearance: Normal appearance. She is obese.   HENT:      Head: Atraumatic.      Mouth/Throat:      Mouth: Mucous membranes are moist.      Pharynx: Oropharynx is clear.   Eyes:      Extraocular Movements: Extraocular movements intact.   Pulmonary:      Effort: Pulmonary effort is normal.   Skin:     Capillary Refill: Capillary refill takes less than 2 seconds.   Neurological:      General: No focal deficit present.      Mental Status: She is alert and oriented to person, place, and time.   Psychiatric:         Mood and Affect: Mood normal.         Behavior: Behavior normal.        Result Review :                     Assessment and Plan     Diagnoses and all orders for this visit:    1. Attention deficit hyperactivity disorder (ADHD), unspecified ADHD type (Primary)  -     Urine Drug Screen - Urine, Clean Catch    2. Healthcare maintenance  -     Hemoglobin A1c  -     Lipid " Panel  -     Comprehensive Metabolic Panel  -     CBC & Differential  -     TSH    3. Post partum depression  -     venlafaxine (EFFEXOR) 50 MG tablet; Take 1 tablet by mouth 2 (Two) Times a Day.  Dispense: 30 tablet; Refill: 1    4. Weight loss    Other orders  -     Discontinue: escitalopram (Lexapro) 20 MG tablet; Take 1 tablet by mouth Daily.  Dispense: 30 tablet; Refill: 2      Doing well today overall.  Will obtain UDS, controlled substances agreement for management of her ADHD.  Although at the current time her symptoms are well-controlled on her current regimen of Vyvanse 20, will continue that moving forward.  From postpartum depression standpoint and concerned that she is not seeing significant improvement on the Wellbutrin, discussed returning to Lexapro versus trialing something else, patient would like to start something that will help her avoid gaining weight.  Will trial Effexor for this patient, will return to clinic in a month for additional adjustment.  Should that fail we will go back to Lexapro.  Patient would like to lose 30 pounds prior to her honeymoon.  Made recommendations about keeping a healthy diet, discussed patient's basal metabolic rate and targeting 500 nga daily calorie reduction, 150 minutes moderate intensity activity for the next 6 weeks.  Patient will attempt adhere to this diet she is already working out this frequently, will reevaluate at her follow-up appointment for management of her mood to determine whether additional agents are necessary to help her expedite her weight loss goals.       Follow Up     No follow-ups on file.  Patient was given instructions and counseling regarding her condition or for health maintenance advice. Please see specific information pulled into the AVS if appropriate.

## 2024-02-29 ENCOUNTER — LAB (OUTPATIENT)
Dept: LAB | Facility: HOSPITAL | Age: 24
End: 2024-02-29
Payer: COMMERCIAL

## 2024-02-29 DIAGNOSIS — N17.9 AKI (ACUTE KIDNEY INJURY): Primary | ICD-10-CM

## 2024-02-29 DIAGNOSIS — F90.9 ATTENTION DEFICIT HYPERACTIVITY DISORDER (ADHD), UNSPECIFIED ADHD TYPE: ICD-10-CM

## 2024-02-29 LAB
ALBUMIN SERPL-MCNC: 4.5 G/DL (ref 3.5–5.2)
ALBUMIN/GLOB SERPL: 1.5 G/DL
ALP SERPL-CCNC: 69 U/L (ref 39–117)
ALT SERPL W P-5'-P-CCNC: 25 U/L (ref 1–33)
AMPHET+METHAMPHET UR QL: NEGATIVE
ANION GAP SERPL CALCULATED.3IONS-SCNC: 9 MMOL/L (ref 5–15)
AST SERPL-CCNC: 17 U/L (ref 1–32)
BARBITURATES UR QL SCN: NEGATIVE
BASOPHILS # BLD AUTO: 0.05 10*3/MM3 (ref 0–0.2)
BASOPHILS NFR BLD AUTO: 0.6 % (ref 0–1.5)
BENZODIAZ UR QL SCN: NEGATIVE
BILIRUB SERPL-MCNC: 0.5 MG/DL (ref 0–1.2)
BUN SERPL-MCNC: 12 MG/DL (ref 6–20)
BUN/CREAT SERPL: 9.9 (ref 7–25)
CALCIUM SPEC-SCNC: 9.8 MG/DL (ref 8.6–10.5)
CANNABINOIDS SERPL QL: NEGATIVE
CHLORIDE SERPL-SCNC: 107 MMOL/L (ref 98–107)
CHOLEST SERPL-MCNC: 179 MG/DL (ref 0–200)
CO2 SERPL-SCNC: 25 MMOL/L (ref 22–29)
COCAINE UR QL: NEGATIVE
CREAT SERPL-MCNC: 1.21 MG/DL (ref 0.57–1)
DEPRECATED RDW RBC AUTO: 42.5 FL (ref 37–54)
EGFRCR SERPLBLD CKD-EPI 2021: 64.3 ML/MIN/1.73
EOSINOPHIL # BLD AUTO: 0.41 10*3/MM3 (ref 0–0.4)
EOSINOPHIL NFR BLD AUTO: 5.2 % (ref 0.3–6.2)
ERYTHROCYTE [DISTWIDTH] IN BLOOD BY AUTOMATED COUNT: 13.1 % (ref 12.3–15.4)
FENTANYL UR-MCNC: NEGATIVE NG/ML
GLOBULIN UR ELPH-MCNC: 3.1 GM/DL
GLUCOSE SERPL-MCNC: 103 MG/DL (ref 65–99)
HBA1C MFR BLD: 5.4 % (ref 4.8–5.6)
HCT VFR BLD AUTO: 40.1 % (ref 34–46.6)
HDLC SERPL-MCNC: 49 MG/DL (ref 40–60)
HGB BLD-MCNC: 13.4 G/DL (ref 12–15.9)
IMM GRANULOCYTES # BLD AUTO: 0.02 10*3/MM3 (ref 0–0.05)
IMM GRANULOCYTES NFR BLD AUTO: 0.3 % (ref 0–0.5)
LDLC SERPL CALC-MCNC: 117 MG/DL (ref 0–100)
LDLC/HDLC SERPL: 2.37 {RATIO}
LYMPHOCYTES # BLD AUTO: 2.02 10*3/MM3 (ref 0.7–3.1)
LYMPHOCYTES NFR BLD AUTO: 25.6 % (ref 19.6–45.3)
MCH RBC QN AUTO: 29.9 PG (ref 26.6–33)
MCHC RBC AUTO-ENTMCNC: 33.4 G/DL (ref 31.5–35.7)
MCV RBC AUTO: 89.5 FL (ref 79–97)
METHADONE UR QL SCN: NEGATIVE
MONOCYTES # BLD AUTO: 0.53 10*3/MM3 (ref 0.1–0.9)
MONOCYTES NFR BLD AUTO: 6.7 % (ref 5–12)
NEUTROPHILS NFR BLD AUTO: 4.87 10*3/MM3 (ref 1.7–7)
NEUTROPHILS NFR BLD AUTO: 61.6 % (ref 42.7–76)
NRBC BLD AUTO-RTO: 0 /100 WBC (ref 0–0.2)
OPIATES UR QL: NEGATIVE
OXYCODONE UR QL SCN: NEGATIVE
PLATELET # BLD AUTO: 427 10*3/MM3 (ref 140–450)
PMV BLD AUTO: 9.8 FL (ref 6–12)
POTASSIUM SERPL-SCNC: 4.5 MMOL/L (ref 3.5–5.2)
PROT SERPL-MCNC: 7.6 G/DL (ref 6–8.5)
RBC # BLD AUTO: 4.48 10*6/MM3 (ref 3.77–5.28)
SODIUM SERPL-SCNC: 141 MMOL/L (ref 136–145)
TRIGL SERPL-MCNC: 69 MG/DL (ref 0–150)
TSH SERPL DL<=0.05 MIU/L-ACNC: 0.97 UIU/ML (ref 0.27–4.2)
VLDLC SERPL-MCNC: 13 MG/DL (ref 5–40)
WBC NRBC COR # BLD AUTO: 7.9 10*3/MM3 (ref 3.4–10.8)

## 2024-02-29 PROCEDURE — 80307 DRUG TEST PRSMV CHEM ANLYZR: CPT | Performed by: STUDENT IN AN ORGANIZED HEALTH CARE EDUCATION/TRAINING PROGRAM

## 2024-02-29 PROCEDURE — 36415 COLL VENOUS BLD VENIPUNCTURE: CPT | Performed by: STUDENT IN AN ORGANIZED HEALTH CARE EDUCATION/TRAINING PROGRAM

## 2024-02-29 PROCEDURE — 80061 LIPID PANEL: CPT | Performed by: STUDENT IN AN ORGANIZED HEALTH CARE EDUCATION/TRAINING PROGRAM

## 2024-02-29 PROCEDURE — 83036 HEMOGLOBIN GLYCOSYLATED A1C: CPT | Performed by: STUDENT IN AN ORGANIZED HEALTH CARE EDUCATION/TRAINING PROGRAM

## 2024-02-29 PROCEDURE — 80050 GENERAL HEALTH PANEL: CPT | Performed by: STUDENT IN AN ORGANIZED HEALTH CARE EDUCATION/TRAINING PROGRAM

## 2024-02-29 RX ORDER — LISDEXAMFETAMINE DIMESYLATE CAPSULES 20 MG/1
20 CAPSULE ORAL EVERY MORNING
Qty: 30 CAPSULE | Refills: 0 | Status: SHIPPED | OUTPATIENT
Start: 2024-02-29

## 2024-03-27 ENCOUNTER — LAB (OUTPATIENT)
Dept: LAB | Facility: HOSPITAL | Age: 24
End: 2024-03-27
Payer: COMMERCIAL

## 2024-03-27 PROCEDURE — 80069 RENAL FUNCTION PANEL: CPT | Performed by: STUDENT IN AN ORGANIZED HEALTH CARE EDUCATION/TRAINING PROGRAM

## 2024-04-02 DIAGNOSIS — F90.9 ATTENTION DEFICIT HYPERACTIVITY DISORDER (ADHD), UNSPECIFIED ADHD TYPE: ICD-10-CM

## 2024-04-03 RX ORDER — LISDEXAMFETAMINE DIMESYLATE CAPSULES 20 MG/1
20 CAPSULE ORAL EVERY MORNING
Qty: 30 CAPSULE | Refills: 0 | Status: SHIPPED | OUTPATIENT
Start: 2024-04-03

## 2024-04-03 RX ORDER — VENLAFAXINE 50 MG/1
50 TABLET ORAL 2 TIMES DAILY
Qty: 30 TABLET | Refills: 1 | Status: SHIPPED | OUTPATIENT
Start: 2024-04-03

## 2024-05-22 ENCOUNTER — PATIENT MESSAGE (OUTPATIENT)
Dept: INTERNAL MEDICINE | Facility: CLINIC | Age: 24
End: 2024-05-22

## 2024-05-22 ENCOUNTER — OFFICE VISIT (OUTPATIENT)
Dept: INTERNAL MEDICINE | Facility: CLINIC | Age: 24
End: 2024-05-22
Payer: COMMERCIAL

## 2024-05-22 VITALS — BODY MASS INDEX: 32.44 KG/M2 | WEIGHT: 190 LBS | TEMPERATURE: 98.3 F | HEIGHT: 64 IN

## 2024-05-22 DIAGNOSIS — F90.9 ATTENTION DEFICIT HYPERACTIVITY DISORDER (ADHD), UNSPECIFIED ADHD TYPE: ICD-10-CM

## 2024-05-22 PROCEDURE — 99213 OFFICE O/P EST LOW 20 MIN: CPT | Performed by: STUDENT IN AN ORGANIZED HEALTH CARE EDUCATION/TRAINING PROGRAM

## 2024-05-22 RX ORDER — VENLAFAXINE 50 MG/1
50 TABLET ORAL 2 TIMES DAILY
Qty: 30 TABLET | Refills: 1 | Status: SHIPPED | OUTPATIENT
Start: 2024-05-22 | End: 2024-05-22

## 2024-05-22 RX ORDER — LISDEXAMFETAMINE DIMESYLATE 30 MG/1
30 CAPSULE ORAL EVERY MORNING
Qty: 30 CAPSULE | Refills: 0 | Status: SHIPPED | OUTPATIENT
Start: 2024-05-22 | End: 2024-05-23 | Stop reason: SDUPTHER

## 2024-05-22 RX ORDER — VENLAFAXINE 50 MG/1
50 TABLET ORAL DAILY
Qty: 30 TABLET | Refills: 1 | Status: SHIPPED | OUTPATIENT
Start: 2024-05-22

## 2024-05-22 NOTE — PROGRESS NOTES
"Chief Complaint  ADHD (Follow up )    Subjective        Rosalina Pardo presents to [facility]  History of Present Illness  #ADHD  Not sure if the 20mg dose of the vyvanse is sufficient. Finding herself taking the medication and not feeling much different, often has her loved ones asking if she has taken her medication after she has as they aren't seeing much effect. Otherwise doing well and managing well. Handling work/motherhood well without issue.    #Mood  Feeling improved on the effexor, taking only 50 daily and feeling better, closer to her typical self. Interested in overall dose reduction  #Obesity  Weight continues to come down! Down to 190 from 210 around pavel, diet, exercise, moving with her baby and work help contribute.  Objective   Vital Signs:  Temp 98.3 °F (36.8 °C)   Ht 162.6 cm (64.02\")   Wt 86.2 kg (190 lb)   BMI 32.60 kg/m²   Estimated body mass index is 32.6 kg/m² as calculated from the following:    Height as of this encounter: 162.6 cm (64.02\").    Weight as of this encounter: 86.2 kg (190 lb).             Physical Exam  Vitals reviewed.   Constitutional:       General: She is not in acute distress.     Appearance: Normal appearance.   HENT:      Head: Normocephalic and atraumatic.   Eyes:      General: No scleral icterus.     Extraocular Movements: Extraocular movements intact.      Pupils: Pupils are equal, round, and reactive to light.   Cardiovascular:      Rate and Rhythm: Normal rate and regular rhythm.      Heart sounds: No murmur heard.     No friction rub. No gallop.   Pulmonary:      Effort: Pulmonary effort is normal.      Breath sounds: Normal breath sounds. No wheezing, rhonchi or rales.   Abdominal:      General: Abdomen is flat. Bowel sounds are normal. There is no distension.      Palpations: Abdomen is soft.      Tenderness: There is no abdominal tenderness. There is no guarding.   Musculoskeletal:         General: Normal range of motion. "      Right lower leg: No edema.      Left lower leg: No edema.   Skin:     General: Skin is warm and dry.      Capillary Refill: Capillary refill takes less than 2 seconds.      Coloration: Skin is not jaundiced.      Findings: No rash.   Neurological:      General: No focal deficit present.      Mental Status: She is alert and oriented to person, place, and time.   Psychiatric:         Mood and Affect: Mood normal.         Behavior: Behavior normal.        Result Review :                     Assessment and Plan     Diagnoses and all orders for this visit:    1. Post partum depression  Assessment & Plan:  Symptoms much improved on the effexor, reducing dose independently to 50mg daily already, so caity lmove to this for 6 weeks and see how she tolerates. If still feeling well we will trial her off medication.    Orders:  -     Discontinue: venlafaxine (EFFEXOR) 50 MG tablet; Take 1 tablet by mouth 2 (Two) Times a Day.  Dispense: 30 tablet; Refill: 1  -     venlafaxine (EFFEXOR) 50 MG tablet; Take 1 tablet by mouth Daily.  Dispense: 30 tablet; Refill: 1    2. Attention deficit hyperactivity disorder (ADHD), unspecified ADHD type  Assessment & Plan:  Improved but suboptimal on the 20 so we will go to the 30mg dose of the vyvanse for a month and see if that improves her symptoms.     Orders:  -     lisdexamfetamine (VYVANSE) 30 MG capsule; Take 1 capsule by mouth Every Morning  Dispense: 30 capsule; Refill: 0             Follow Up     No follow-ups on file.  Patient was given instructions and counseling regarding her condition or for health maintenance advice. Please see specific information pulled into the AVS if appropriate.

## 2024-05-23 RX ORDER — LISDEXAMFETAMINE DIMESYLATE 30 MG/1
30 CAPSULE ORAL EVERY MORNING
Qty: 30 CAPSULE | Refills: 0 | Status: SHIPPED | OUTPATIENT
Start: 2024-05-23

## 2024-05-23 NOTE — TELEPHONE ENCOUNTER
From: Rosalina Pardo  To: ASIM Armendariz  Sent: 5/22/2024 5:10 PM EDT  Subject: Pharmacy    Can you send my vyvanse medication to the [facility] pharmacy in the [facility]

## 2024-05-23 NOTE — ASSESSMENT & PLAN NOTE
Improved but suboptimal on the 20 so we will go to the 30mg dose of the vyvanse for a month and see if that improves her symptoms.   
Symptoms much improved on the effexor, reducing dose independently to 50mg daily already, so caity lmove to this for 6 weeks and see how she tolerates. If still feeling well we will trial her off medication.  
[facility]

## 2024-06-17 ENCOUNTER — OFFICE VISIT (OUTPATIENT)
Dept: INTERNAL MEDICINE | Facility: CLINIC | Age: 24
End: 2024-06-17
Payer: COMMERCIAL

## 2024-06-17 VITALS
DIASTOLIC BLOOD PRESSURE: 60 MMHG | TEMPERATURE: 98.3 F | SYSTOLIC BLOOD PRESSURE: 107 MMHG | HEIGHT: 64 IN | WEIGHT: 184.4 LBS | BODY MASS INDEX: 31.48 KG/M2

## 2024-06-17 DIAGNOSIS — N94.6 DYSMENORRHEA: ICD-10-CM

## 2024-06-17 DIAGNOSIS — N30.01 ACUTE CYSTITIS WITH HEMATURIA: Primary | ICD-10-CM

## 2024-06-17 LAB
BILIRUB BLD-MCNC: ABNORMAL MG/DL
CLARITY, POC: ABNORMAL
COLOR UR: ABNORMAL
EXPIRATION DATE: ABNORMAL
GLUCOSE UR STRIP-MCNC: NEGATIVE MG/DL
KETONES UR QL: NEGATIVE
LEUKOCYTE EST, POC: ABNORMAL
Lab: ABNORMAL
NITRITE UR-MCNC: NEGATIVE MG/ML
PH UR: 6 [PH] (ref 5–8)
PROT UR STRIP-MCNC: ABNORMAL MG/DL
RBC # UR STRIP: NEGATIVE /UL
SP GR UR: 1.03 (ref 1–1.03)
UROBILINOGEN UR QL: NORMAL

## 2024-06-17 PROCEDURE — 81003 URINALYSIS AUTO W/O SCOPE: CPT | Performed by: STUDENT IN AN ORGANIZED HEALTH CARE EDUCATION/TRAINING PROGRAM

## 2024-06-17 PROCEDURE — 99213 OFFICE O/P EST LOW 20 MIN: CPT | Performed by: STUDENT IN AN ORGANIZED HEALTH CARE EDUCATION/TRAINING PROGRAM

## 2024-06-17 NOTE — PROGRESS NOTES
"Chief Complaint  Urinary Tract Infection    Subjective        Rosalina Pardo presents to [facility]  Urinary Tract Infection       #Dysuria  More discomfort than dysuria, no urgency, frequency    #Dysmenorrhea  [place] than usual mensutrual cycle. 2 days, normaly 5, but normal flow during.  Recently d/c ocps, not using contraception currently, remains active  Objective   Vital Signs:  /60 (BP Location: Left arm, Patient Position: Sitting)   Temp 98.3 °F (36.8 °C)   Ht 162.6 cm (64.02\")   Wt 83.6 kg (184 lb 6.4 oz)   BMI 31.64 kg/m²   Estimated body mass index is 31.64 kg/m² as calculated from the following:    Height as of this encounter: 162.6 cm (64.02\").    Weight as of this encounter: 83.6 kg (184 lb 6.4 oz).             Physical Exam  Vitals reviewed.   Constitutional:       General: She is not in acute distress.     Appearance: Normal appearance.   HENT:      Head: Normocephalic and atraumatic.   Eyes:      General: No scleral icterus.     Extraocular Movements: Extraocular movements intact.      Pupils: Pupils are equal, round, and reactive to light.   Cardiovascular:      Rate and Rhythm: Normal rate and regular rhythm.      Heart sounds: No murmur heard.     No friction rub. No gallop.   Pulmonary:      Effort: Pulmonary effort is normal.      Breath sounds: Normal breath sounds. No wheezing, rhonchi or rales.   Abdominal:      General: Abdomen is flat. Bowel sounds are normal. There is no distension.      Palpations: Abdomen is soft.      Tenderness: There is no abdominal tenderness. There is no guarding.   Musculoskeletal:         General: Normal range of motion.      Right lower leg: No edema.      Left lower leg: No edema.   Skin:     General: Skin is warm and dry.      Capillary Refill: Capillary refill takes less than 2 seconds.      Coloration: Skin is not jaundiced.      Findings: No rash.   Neurological:      General: No focal deficit present.      Mental " Status: She is alert and oriented to person, place, and time.   Psychiatric:         Mood and Affect: Mood normal.         Behavior: Behavior normal.        Result Review :                     Assessment and Plan     Diagnoses and all orders for this visit:    1. Acute cystitis with hematuria (Primary)  -     POCT urinalysis dipstick, automated  -     Comprehensive metabolic panel  -     CBC w AUTO Differential    2. Dysmenorrhea  -     hCG, Serum, Qualitative      Likely pure cystitis given + leuks on ua, however will rule/out pregnancy prior to treating. Once pregnancy test negative, will treat with antibiotics.  Should pregnancy test, positive will treat with fosfomycin.       Follow Up     No follow-ups on file.  Patient was given instructions and counseling regarding her condition or for health maintenance advice. Please see specific information pulled into the AVS if appropriate.

## 2024-06-18 LAB
ALBUMIN SERPL-MCNC: 4.6 G/DL (ref 3.5–5.2)
ALBUMIN/GLOB SERPL: 1.6 G/DL
ALP SERPL-CCNC: 98 U/L (ref 39–117)
ALT SERPL-CCNC: 13 U/L (ref 1–33)
AST SERPL-CCNC: 19 U/L (ref 1–32)
B-HCG SERPL QL: NEGATIVE
BASOPHILS # BLD AUTO: 0.04 10*3/MM3 (ref 0–0.2)
BASOPHILS NFR BLD AUTO: 0.5 % (ref 0–1.5)
BILIRUB SERPL-MCNC: 0.6 MG/DL (ref 0–1.2)
BUN SERPL-MCNC: 13 MG/DL (ref 6–20)
BUN/CREAT SERPL: 13.8 (ref 7–25)
CALCIUM SERPL-MCNC: 9.6 MG/DL (ref 8.6–10.5)
CHLORIDE SERPL-SCNC: 101 MMOL/L (ref 98–107)
CO2 SERPL-SCNC: 24.1 MMOL/L (ref 22–29)
CREAT SERPL-MCNC: 0.94 MG/DL (ref 0.57–1)
EGFRCR SERPLBLD CKD-EPI 2021: 87.1 ML/MIN/1.73
EOSINOPHIL # BLD AUTO: 0.27 10*3/MM3 (ref 0–0.4)
EOSINOPHIL NFR BLD AUTO: 3.7 % (ref 0.3–6.2)
ERYTHROCYTE [DISTWIDTH] IN BLOOD BY AUTOMATED COUNT: 12.6 % (ref 12.3–15.4)
GLOBULIN SER CALC-MCNC: 2.8 GM/DL
GLUCOSE SERPL-MCNC: 85 MG/DL (ref 65–99)
HCT VFR BLD AUTO: 42.4 % (ref 34–46.6)
HGB BLD-MCNC: 14 G/DL (ref 12–15.9)
IMM GRANULOCYTES # BLD AUTO: 0.01 10*3/MM3 (ref 0–0.05)
IMM GRANULOCYTES NFR BLD AUTO: 0.1 % (ref 0–0.5)
LYMPHOCYTES # BLD AUTO: 1.62 10*3/MM3 (ref 0.7–3.1)
LYMPHOCYTES NFR BLD AUTO: 22.3 % (ref 19.6–45.3)
MCH RBC QN AUTO: 30.1 PG (ref 26.6–33)
MCHC RBC AUTO-ENTMCNC: 33 G/DL (ref 31.5–35.7)
MCV RBC AUTO: 91.2 FL (ref 79–97)
MONOCYTES # BLD AUTO: 0.61 10*3/MM3 (ref 0.1–0.9)
MONOCYTES NFR BLD AUTO: 8.4 % (ref 5–12)
NEUTROPHILS # BLD AUTO: 4.73 10*3/MM3 (ref 1.7–7)
NEUTROPHILS NFR BLD AUTO: 65 % (ref 42.7–76)
NRBC BLD AUTO-RTO: 0 /100 WBC (ref 0–0.2)
PLATELET # BLD AUTO: 376 10*3/MM3 (ref 140–450)
POTASSIUM SERPL-SCNC: 4.5 MMOL/L (ref 3.5–5.2)
PROT SERPL-MCNC: 7.4 G/DL (ref 6–8.5)
RBC # BLD AUTO: 4.65 10*6/MM3 (ref 3.77–5.28)
SODIUM SERPL-SCNC: 138 MMOL/L (ref 136–145)
WBC # BLD AUTO: 7.28 10*3/MM3 (ref 3.4–10.8)

## 2024-06-19 RX ORDER — NITROFURANTOIN 25; 75 MG/1; MG/1
100 CAPSULE ORAL 2 TIMES DAILY
Qty: 10 CAPSULE | Refills: 0 | Status: SHIPPED | OUTPATIENT
Start: 2024-06-19 | End: 2024-06-24

## 2024-06-25 DIAGNOSIS — F90.9 ATTENTION DEFICIT HYPERACTIVITY DISORDER (ADHD), UNSPECIFIED ADHD TYPE: ICD-10-CM

## 2024-06-25 RX ORDER — LISDEXAMFETAMINE DIMESYLATE 30 MG/1
30 CAPSULE ORAL EVERY MORNING
Qty: 30 CAPSULE | Refills: 0 | Status: SHIPPED | OUTPATIENT
Start: 2024-06-25

## 2024-07-28 DIAGNOSIS — Z30.011 ENCOUNTER FOR INITIAL PRESCRIPTION OF CONTRACEPTIVE PILLS: ICD-10-CM

## 2024-07-29 RX ORDER — NORGESTIMATE AND ETHINYL ESTRADIOL 0.25-0.035
1 KIT ORAL DAILY
Qty: 84 TABLET | Refills: 0 | Status: SHIPPED | OUTPATIENT
Start: 2024-07-29

## 2024-08-14 ENCOUNTER — OFFICE VISIT (OUTPATIENT)
Dept: INTERNAL MEDICINE | Facility: CLINIC | Age: 24
End: 2024-08-14
Payer: COMMERCIAL

## 2024-08-14 VITALS
BODY MASS INDEX: 31.69 KG/M2 | TEMPERATURE: 98 F | HEIGHT: 64 IN | WEIGHT: 185.6 LBS | SYSTOLIC BLOOD PRESSURE: 116 MMHG | DIASTOLIC BLOOD PRESSURE: 87 MMHG

## 2024-08-14 DIAGNOSIS — F90.9 ATTENTION DEFICIT HYPERACTIVITY DISORDER (ADHD), UNSPECIFIED ADHD TYPE: ICD-10-CM

## 2024-08-14 PROCEDURE — 99214 OFFICE O/P EST MOD 30 MIN: CPT | Performed by: STUDENT IN AN ORGANIZED HEALTH CARE EDUCATION/TRAINING PROGRAM

## 2024-08-14 RX ORDER — LISDEXAMFETAMINE DIMESYLATE 30 MG/1
30 CAPSULE ORAL EVERY MORNING
Qty: 30 CAPSULE | Refills: 0 | Status: SHIPPED | OUTPATIENT
Start: 2024-08-14

## 2024-08-14 NOTE — ASSESSMENT & PLAN NOTE
Improved on the 30mg dose of vyvanse, tolerating without issue. Feels like things are much improved overall at this dose.

## 2024-08-14 NOTE — PROGRESS NOTES
"Chief Complaint  No chief complaint on file.    mEmy Pardo presents to [facility]  History of Present Illness  #ADHD  On vyvanse 30, moved from 20 after may visit. Feels like this is a better dose for her, not running out of energy at the end of the day, feeling more in control    #Mood  Dose reduced to effexor 50, tolerated dose reduction without issue, completely tapered off on her own, not currently taking any, anxiety well controlled at present .  Objective   Vital Signs:  /87   Temp 98 °F (36.7 °C)   Ht 162.6 cm (64.02\")   Wt 84.2 kg (185 lb 9.6 oz)   BMI 31.84 kg/m²   Estimated body mass index is 31.84 kg/m² as calculated from the following:    Height as of this encounter: 162.6 cm (64.02\").    Weight as of this encounter: 84.2 kg (185 lb 9.6 oz).    BMI is >= 30 and <35. (Class 1 Obesity). The following options were offered after discussion;: exercise counseling/recommendations and nutrition counseling/recommendations      Physical Exam  Vitals reviewed.   Constitutional:       General: She is not in acute distress.     Appearance: Normal appearance.   HENT:      Head: Normocephalic and atraumatic.   Eyes:      General: No scleral icterus.     Extraocular Movements: Extraocular movements intact.      Pupils: Pupils are equal, round, and reactive to light.   Cardiovascular:      Rate and Rhythm: Normal rate and regular rhythm.      Heart sounds: No murmur heard.     No friction rub. No gallop.   Pulmonary:      Effort: Pulmonary effort is normal.      Breath sounds: Normal breath sounds. No wheezing, rhonchi or rales.   Abdominal:      General: Abdomen is flat. Bowel sounds are normal. There is no distension.      Palpations: Abdomen is soft.      Tenderness: There is no abdominal tenderness. There is no guarding.   Musculoskeletal:         General: Normal range of motion.      Right lower leg: No edema.      Left lower leg: No edema.   Skin:     " General: Skin is warm and dry.      Capillary Refill: Capillary refill takes less than 2 seconds.      Coloration: Skin is not jaundiced.      Findings: No rash.   Neurological:      General: No focal deficit present.      Mental Status: She is alert and oriented to person, place, and time.   Psychiatric:         Mood and Affect: Mood normal.         Behavior: Behavior normal.        Result Review :                Assessment and Plan   Diagnoses and all orders for this visit:    1. Post partum depression (Primary)  Assessment & Plan:  Now off medication, can be considered in remission. Will continue to monitor during subsequent post-partum periods. Improved on effexor and now successfully titrated off.      2. Attention deficit hyperactivity disorder (ADHD), unspecified ADHD type  Assessment & Plan:  Improved on the 30mg dose of vyvanse, tolerating without issue. Feels like things are much improved overall at this dose.     Orders:  -     lisdexamfetamine (Vyvanse) 30 MG capsule; Take 1 capsule by mouth Every Morning  Dispense: 30 capsule; Refill: 0             Follow Up   Return in about 3 months (around 11/14/2024) for Video visit.  Patient was given instructions and counseling regarding her condition or for health maintenance advice. Please see specific information pulled into the AVS if appropriate.

## 2024-08-14 NOTE — ASSESSMENT & PLAN NOTE
Now off medication, can be considered in remission. Will continue to monitor during subsequent post-partum periods. Improved on effexor and now successfully titrated off.

## 2024-08-22 RX ORDER — BUPROPION HYDROCHLORIDE 150 MG/1
150 TABLET ORAL DAILY
Qty: 30 TABLET | Refills: 1 | Status: SHIPPED | OUTPATIENT
Start: 2024-08-22

## 2024-09-09 ENCOUNTER — TELEPHONE (OUTPATIENT)
Dept: OBSTETRICS AND GYNECOLOGY | Facility: CLINIC | Age: 24
End: 2024-09-09

## 2024-09-09 NOTE — TELEPHONE ENCOUNTER
Pt scheduled for U/S on  9/10 @ 3:00. Pt states she will wait to be seen until GYN appt scheduled with you on 9/11.

## 2024-09-09 NOTE — TELEPHONE ENCOUNTER
Pt is requesting to schedule U/S and GYN appt for worsening pelvic pain and cramping. Pt is requesting to be seen today. Pt is already scheduled for GYN appt on 9/11 with   Please advise  thanks

## 2024-09-09 NOTE — TELEPHONE ENCOUNTER
Hub staff attempted to follow warm transfer process and was unsuccessful     Caller: Rosalina Pardo    Relationship to patient: Self    Best call back number: [phone] CALL ANYTIME OR SEND [employer] MESSAGE.     Patient is needing: PATIENT CALLED REQUESTING TO SCHEDULE ULTRASOUND AND GYN FOLLOW UP FOR WORSENING PELVIC PAIN AND CRAMPING. PATIENT DECLINED ABNORMAL BLEEDING. PATIENT IS REQUESTING TO BE SEE TODAY BY  OR IKER. 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

## 2024-09-11 ENCOUNTER — OFFICE VISIT (OUTPATIENT)
Dept: OBSTETRICS AND GYNECOLOGY | Facility: CLINIC | Age: 24
End: 2024-09-11
Payer: COMMERCIAL

## 2024-09-11 VITALS
BODY MASS INDEX: 32.33 KG/M2 | WEIGHT: 189.4 LBS | DIASTOLIC BLOOD PRESSURE: 72 MMHG | HEIGHT: 64 IN | SYSTOLIC BLOOD PRESSURE: 110 MMHG | HEART RATE: 63 BPM

## 2024-09-11 DIAGNOSIS — Z01.419 ENCOUNTER FOR GYNECOLOGICAL EXAMINATION: Primary | ICD-10-CM

## 2024-09-11 PROCEDURE — 99395 PREV VISIT EST AGE 18-39: CPT | Performed by: OBSTETRICS & GYNECOLOGY

## 2024-09-11 NOTE — PROGRESS NOTES
"Chief Complaint  Annual Exam- Pap- repeat. Pt also here to follow up for ultrasound   Subjective        Rosalina Pardo presents to [facility]  History of Present Illness  Patient is a 24-year-old who presents for gynecological exam.  She reports that she had severe cramping Sunday night and has never had bad cramping with her menstrual periods.  She started her period on Monday and states that the cramping has now resolved.  She had called in Monday morning asking for a pelvic ultrasound and had an ultrasound before her visit today.  She reports having regular menstrual cycles and states her and her  just recently started trying to conceive again.  She has no other complaints.  Objective   Vital Signs:  /72   Pulse 63   Ht 162.6 cm (64\")   Wt 85.9 kg (189 lb 6.4 oz)   BMI 32.51 kg/m²   Estimated body mass index is 32.51 kg/m² as calculated from the following:    Height as of this encounter: 162.6 cm (64\").    Weight as of this encounter: 85.9 kg (189 lb 6.4 oz).            Physical Exam  Vitals reviewed. Exam conducted with a chaperone present.   Constitutional:       Appearance: She is well-developed.   Cardiovascular:      Rate and Rhythm: Normal rate and regular rhythm.   Pulmonary:      Effort: Pulmonary effort is normal.      Breath sounds: Normal breath sounds.   Chest:   Breasts:     Right: No inverted nipple, mass, nipple discharge, skin change or tenderness.      Left: No inverted nipple, mass, nipple discharge, skin change or tenderness.   Abdominal:      General: There is no distension.      Palpations: Abdomen is soft.      Tenderness: There is no abdominal tenderness.   Genitourinary:     Labia:         Right: No rash, tenderness, lesion or injury.         Left: No rash, tenderness, lesion or injury.       Vagina: Normal.      Cervix: Normal.      Uterus: Normal.       Adnexa:         Right: No mass, tenderness or fullness.          Left: No mass, tenderness or " fullness.     Neurological:      Mental Status: She is alert.        Result Review :                Assessment and Plan   Diagnoses and all orders for this visit:    1. Encounter for gynecological examination (Primary)  -     IGP, Rfx Aptima HPV ASCU    Reviewed normal pelvic ultrasound with the patient.  Screening Pap smear was collected.  She was counseled on monthly self breast exams for breast health and she may follow-up in 1 year or sooner if needed.  She was counseled on recommendations to start a prenatal tablet prior to conception.         Follow Up   Return in about 1 year (around 9/11/2025) for gynecological exam.  Patient was given instructions and counseling regarding her condition or for health maintenance advice. Please see specific information pulled into the AVS if appropriate.

## 2024-09-17 LAB
CONV .: NORMAL
CYTOLOGIST CVX/VAG CYTO: NORMAL
CYTOLOGY CVX/VAG DOC CYTO: NORMAL
CYTOLOGY CVX/VAG DOC THIN PREP: NORMAL
DX ICD CODE: NORMAL
Lab: NORMAL
OTHER STN SPEC: NORMAL
STAT OF ADQ CVX/VAG CYTO-IMP: NORMAL

## 2024-09-27 DIAGNOSIS — F90.9 ATTENTION DEFICIT HYPERACTIVITY DISORDER (ADHD), UNSPECIFIED ADHD TYPE: ICD-10-CM

## 2024-09-30 RX ORDER — LISDEXAMFETAMINE DIMESYLATE 30 MG/1
30 CAPSULE ORAL EVERY MORNING
Qty: 30 CAPSULE | Refills: 0 | Status: SHIPPED | OUTPATIENT
Start: 2024-09-30

## 2024-11-20 ENCOUNTER — INITIAL PRENATAL (OUTPATIENT)
Dept: OBSTETRICS AND GYNECOLOGY | Facility: CLINIC | Age: 24
End: 2024-11-20
Payer: COMMERCIAL

## 2024-11-20 VITALS — SYSTOLIC BLOOD PRESSURE: 116 MMHG | DIASTOLIC BLOOD PRESSURE: 62 MMHG | BODY MASS INDEX: 34.5 KG/M2 | WEIGHT: 201 LBS

## 2024-11-20 DIAGNOSIS — Z34.90 EARLY STAGE OF PREGNANCY: Primary | ICD-10-CM

## 2024-11-20 PROBLEM — N12 PYELONEPHRITIS: Status: RESOLVED | Noted: 2023-12-16 | Resolved: 2024-11-20

## 2024-11-20 PROBLEM — M54.50 CHRONIC BILATERAL LOW BACK PAIN WITHOUT SCIATICA: Status: RESOLVED | Noted: 2019-06-11 | Resolved: 2024-11-20

## 2024-11-20 PROBLEM — Z98.891 PREVIOUS CESAREAN SECTION: Status: ACTIVE | Noted: 2024-11-20

## 2024-11-20 PROBLEM — G89.29 CHRONIC BILATERAL LOW BACK PAIN WITHOUT SCIATICA: Status: RESOLVED | Noted: 2019-06-11 | Resolved: 2024-11-20

## 2024-11-20 PROBLEM — Z00.00 HEALTHCARE MAINTENANCE: Status: RESOLVED | Noted: 2024-02-28 | Resolved: 2024-11-20

## 2024-11-20 PROBLEM — G44.209 TENSION HEADACHE: Status: RESOLVED | Noted: 2019-07-16 | Resolved: 2024-11-20

## 2024-11-20 PROCEDURE — 0501F PRENATAL FLOW SHEET: CPT | Performed by: OBSTETRICS & GYNECOLOGY

## 2024-11-20 RX ORDER — PRENATAL VIT/IRON FUM/FOLIC AC 27MG-0.8MG
TABLET ORAL DAILY
COMMUNITY

## 2024-11-20 NOTE — PROGRESS NOTES
Chief Complaint   Patient presents with    Initial Prenatal Visit     HPI- Pt is 24 y.o.  at 7w3d here for prenatal visit.  She is unsure regarding her LMP.  An ultrasound today does confirm a 7-week 3-day intrauterine pregnancy.  She has a history of a previous  for arrest of for stage of labor after being induced for oligohydramnios at 37 weeks.  She is taking prenatal vitamins and has no complaints today.    ROS-     - No vaginal bleeding    GI- No abdominal pain    /62   Wt 91.2 kg (201 lb)   LMP 10/03/2024 (Approximate)   BMI 34.50 kg/m²   Exam - See flow sheet    Fetal heart rate is normal    Assessment-  Diagnoses and all orders for this visit:    Early stage of pregnancy  -     OB Panel With HIV and Treponema Palldium Ab  -     Drug Profile Urine - 9 Drugs - Urine, Clean Catch  -     Urine Culture - Urine, Urine, Clean Catch  -     Chlamydia trachomatis, Neisseria gonorrhoeae, Trichomonas vaginalis, PCR - Swab, Vagina    Other orders  -     Prenatal Vit-Fe Fumarate-FA (prenatal vitamin 27-0.8) 27-0.8 MG tablet tablet; Take  by mouth Daily.    Reviewed ultrasound and discussed MARIXA.  Patient states that she desires a repeat  with this delivery.  Initial OB labs were ordered and discussed NIPT testing after 10 weeks.  She will follow-up in 4 weeks.

## 2024-11-21 LAB
ABO GROUP BLD: ABNORMAL
AMPHETAMINES UR QL SCN: NEGATIVE NG/ML
BARBITURATES UR QL SCN: NEGATIVE NG/ML
BASOPHILS # BLD AUTO: 0.1 X10E3/UL (ref 0–0.2)
BASOPHILS NFR BLD AUTO: 1 %
BENZODIAZ UR QL: NEGATIVE NG/ML
BLD GP AB SCN SERPL QL: NEGATIVE
BZE UR QL: NEGATIVE NG/ML
C TRACH RRNA SPEC QL NAA+PROBE: NEGATIVE
CANNABINOIDS UR QL SCN: NEGATIVE NG/ML
EOSINOPHIL # BLD AUTO: 0.2 X10E3/UL (ref 0–0.4)
EOSINOPHIL NFR BLD AUTO: 2 %
ERYTHROCYTE [DISTWIDTH] IN BLOOD BY AUTOMATED COUNT: 11.8 % (ref 11.7–15.4)
HBV SURFACE AG SERPL QL IA: NEGATIVE
HCT VFR BLD AUTO: 40.2 % (ref 34–46.6)
HCV AB SERPL QL IA: NORMAL
HCV IGG SERPL QL IA: NON REACTIVE
HGB BLD-MCNC: 13.2 G/DL (ref 11.1–15.9)
HIV 1+2 AB+HIV1 P24 AG SERPL QL IA: NON REACTIVE
IMM GRANULOCYTES # BLD AUTO: 0 X10E3/UL (ref 0–0.1)
IMM GRANULOCYTES NFR BLD AUTO: 0 %
LYMPHOCYTES # BLD AUTO: 2.5 X10E3/UL (ref 0.7–3.1)
LYMPHOCYTES NFR BLD AUTO: 20 %
MCH RBC QN AUTO: 30.3 PG (ref 26.6–33)
MCHC RBC AUTO-ENTMCNC: 32.8 G/DL (ref 31.5–35.7)
MCV RBC AUTO: 92 FL (ref 79–97)
METHADONE UR QL SCN: NEGATIVE NG/ML
MONOCYTES # BLD AUTO: 0.8 X10E3/UL (ref 0.1–0.9)
MONOCYTES NFR BLD AUTO: 6 %
N GONORRHOEA RRNA SPEC QL NAA+PROBE: NEGATIVE
NEUTROPHILS # BLD AUTO: 8.9 X10E3/UL (ref 1.4–7)
NEUTROPHILS NFR BLD AUTO: 71 %
OPIATES UR QL: NEGATIVE NG/ML
PCP UR QL SCN: NEGATIVE NG/ML
PLATELET # BLD AUTO: 411 X10E3/UL (ref 150–450)
PROPOXYPH UR QL SCN: NEGATIVE NG/ML
RBC # BLD AUTO: 4.35 X10E6/UL (ref 3.77–5.28)
RH BLD: POSITIVE
RUBV IGG SERPL IA-ACNC: 1.44 INDEX
T VAGINALIS RRNA SPEC QL NAA+PROBE: NEGATIVE
TREPONEMA PALLIDUM IGG+IGM AB [PRESENCE] IN SERUM OR PLASMA BY IMMUNOASSAY: NON REACTIVE
WBC # BLD AUTO: 12.6 X10E3/UL (ref 3.4–10.8)

## 2024-11-22 DIAGNOSIS — Z36.0 ENCOUNTER FOR ANTENATAL SCREENING FOR CHROMOSOMAL ANOMALIES: Primary | ICD-10-CM

## 2024-11-22 LAB
BACTERIA UR CULT: NORMAL
BACTERIA UR CULT: NORMAL

## 2024-12-16 ENCOUNTER — TELEPHONE (OUTPATIENT)
Dept: OBSTETRICS AND GYNECOLOGY | Facility: CLINIC | Age: 24
End: 2024-12-16
Payer: COMMERCIAL

## 2024-12-16 NOTE — TELEPHONE ENCOUNTER
----- Message from Akila Miranda sent at 12/16/2024 10:37 AM EST -----  Please let patient know that her genetic testing was normal, and if she wants to know gender, it showed a female fetus      
Left vm to call for results   
Patient informed genetic testing was normal. Aware she is having a girl.  
Initiate Treatment: 0.4cc Kenalog mix (3.5mg/cc) injected into scalp for hair restoration-no charge per Pritesh Thomas
Detail Level: Zone
Continue Regimen: Clobetasol topical ointment

## 2024-12-18 ENCOUNTER — ROUTINE PRENATAL (OUTPATIENT)
Dept: OBSTETRICS AND GYNECOLOGY | Facility: CLINIC | Age: 24
End: 2024-12-18
Payer: COMMERCIAL

## 2024-12-18 VITALS — DIASTOLIC BLOOD PRESSURE: 69 MMHG | WEIGHT: 206 LBS | SYSTOLIC BLOOD PRESSURE: 123 MMHG | BODY MASS INDEX: 35.36 KG/M2

## 2024-12-18 DIAGNOSIS — Z3A.11 11 WEEKS GESTATION OF PREGNANCY: ICD-10-CM

## 2024-12-18 DIAGNOSIS — Z98.891 PREVIOUS CESAREAN SECTION: Primary | ICD-10-CM

## 2024-12-18 DIAGNOSIS — Z13.89 SCREENING FOR BLOOD OR PROTEIN IN URINE: ICD-10-CM

## 2024-12-18 LAB
GLUCOSE UR STRIP-MCNC: NEGATIVE MG/DL
PROT UR STRIP-MCNC: NEGATIVE MG/DL

## 2024-12-18 NOTE — PROGRESS NOTES
Chief Complaint   Patient presents with    Routine Prenatal Visit     HPI- Pt is 24 y.o.  at 11w3d here for prenatal visit.  She is doing well and has no complaints.    ROS-     - No vaginal bleeding    GI- No abdominal pain    /69   Wt 93.4 kg (206 lb)   LMP 10/03/2024 (Approximate)   BMI 35.36 kg/m²   Exam - See flow sheet    Fetal heart rate is normal    Assessment-  Diagnoses and all orders for this visit:    Previous  section    11 weeks gestation of pregnancy    Reviewed initial OB labs.  She had normal NIPT testing.  She will follow-up in 4 weeks.

## 2024-12-30 ENCOUNTER — TELEMEDICINE (OUTPATIENT)
Dept: INTERNAL MEDICINE | Facility: CLINIC | Age: 24
End: 2024-12-30
Payer: COMMERCIAL

## 2024-12-30 ENCOUNTER — TELEPHONE (OUTPATIENT)
Dept: INTERNAL MEDICINE | Facility: CLINIC | Age: 24
End: 2024-12-30
Payer: COMMERCIAL

## 2024-12-30 DIAGNOSIS — J40 BRONCHITIS: Primary | ICD-10-CM

## 2024-12-30 PROCEDURE — 99214 OFFICE O/P EST MOD 30 MIN: CPT | Performed by: STUDENT IN AN ORGANIZED HEALTH CARE EDUCATION/TRAINING PROGRAM

## 2024-12-30 RX ORDER — AZITHROMYCIN 250 MG/1
TABLET, FILM COATED ORAL
Qty: 6 TABLET | Refills: 0 | Status: SHIPPED | OUTPATIENT
Start: 2024-12-30 | End: 2025-01-04

## 2024-12-30 NOTE — TELEPHONE ENCOUNTER
I can do a video visit at 430 but I recommend she go to an urgent care to be seen by someone. I can try and fit her in tomorrow for a spot

## 2024-12-30 NOTE — TELEPHONE ENCOUNTER
Pt called in needing to see a provider today. She is coughing, has had a fever for the past 2 days and is 12 weeks pregnant. Please follow up at [phone]

## 2024-12-30 NOTE — PROGRESS NOTES
"Chief Complaint  No chief complaint on file.    Emmy Pardo presents to [facility]  History of Present Illness    Presents via telehealth with complaints of high-grade fevers for the past 3 to 4 days.  She states that about 4 days ago she started to feel bad with generalized malaise, body aches and fevers and has subsequently had worsening symptoms since then.  She went to urgent care 3 days ago where they tested her for COVID and flu both of which were negative.  She states she has had fevers as high as 103.5 that were responsive to Tylenol, she reports of some minimally productive cough but otherwise has no significant shortness of breath, headache, vomiting, abdominal pain, dysuria or other localized symptoms.  She does report some minimal nausea     Objective   Vital Signs:  There were no vitals taken for this visit.  Estimated body mass index is 35.36 kg/m² as calculated from the following:    Height as of 9/11/24: 162.6 cm (64\").    Weight as of 12/27/24: 93.4 kg (206 lb).            Physical Exam  Constitutional:       General: She is not in acute distress.     Appearance: Normal appearance. She is not ill-appearing.   HENT:      Nose: Congestion present.   Neurological:      Mental Status: She is alert.   Psychiatric:         Mood and Affect: Mood normal.         Behavior: Behavior normal.        Result Review :                Assessment and Plan   Diagnoses and all orders for this visit:    1. Bronchitis (Primary)  -     Respiratory Panel PCR w/COVID-19(SARS-CoV-2) ANA/ROSANNA/JW/PAD/COR/EDEN In-House, NP Swab in UTM/VTM, 2 HR TAT - Swab, Nasopharynx; Future  -     azithromycin (ZITHROMAX) 250 MG tablet; Take 2 tablets the first day, then 1 tablet daily for 4 days. (Take with food)  Dispense: 6 tablet; Refill: 0      I would like to repeat respiratory panel to evaluate and rule out other potential viruses as cause.  I did express concern regarding how high " her fevers at home have been being supposedly 102-103.5 regularly however there does not seem to be any localizing symptoms to further evaluate with imaging/labs per history.  I think the most likely cause is viral in nature however given her current pregnancy I did state slightly increased risk for VTE but she has no significant pleuritic chest pain, worsening dyspnea, or leg swelling to suggest this.    I would like to trial azithromycin to see improvement in symptoms however discussed ED precautions if fever persists or any other associated symptoms listed above such as chest pain, shortness of breath, leg swelling.    Return to clinic as previously scheduled, discussed if symptoms persist follow-up with PCP later this week       I spent 30 minutes caring for Rosalina on this date of service. This time includes time spent by me in the following activities:preparing for the visit, obtaining and/or reviewing a separately obtained history, counseling and educating the patient/family/caregiver, and documenting information in the medical record  Follow Up   No follow-ups on file.  Patient was given instructions and counseling regarding her condition or for health maintenance advice. Please see specific information pulled into the AVS if appropriate.     Mode of Visit: Video  Location of patient: -HOME-  Location of provider: +[facility]+  You have chosen to receive care through a telehealth visit.  The patient has signed the video visit consent form.  The visit included audio and video interaction. No technical issues occurred during this visit.

## 2024-12-31 ENCOUNTER — LAB (OUTPATIENT)
Facility: HOSPITAL | Age: 24
End: 2024-12-31
Payer: COMMERCIAL

## 2024-12-31 DIAGNOSIS — J40 BRONCHITIS: ICD-10-CM

## 2024-12-31 PROCEDURE — 0202U NFCT DS 22 TRGT SARS-COV-2: CPT

## 2024-12-31 NOTE — PROGRESS NOTES
Respiratory panel shows no virus. Can you see how she is doing, if she is still having high fevers needs to be seen by PCP by end of this week or go to ER

## 2025-01-15 ENCOUNTER — ROUTINE PRENATAL (OUTPATIENT)
Dept: OBSTETRICS AND GYNECOLOGY | Facility: CLINIC | Age: 25
End: 2025-01-15
Payer: COMMERCIAL

## 2025-01-15 VITALS — WEIGHT: 199.8 LBS | SYSTOLIC BLOOD PRESSURE: 116 MMHG | BODY MASS INDEX: 34.3 KG/M2 | DIASTOLIC BLOOD PRESSURE: 81 MMHG

## 2025-01-15 DIAGNOSIS — Z13.89 SCREENING FOR BLOOD OR PROTEIN IN URINE: ICD-10-CM

## 2025-01-15 DIAGNOSIS — Z36.89 ENCOUNTER FOR FETAL ANATOMIC SURVEY: ICD-10-CM

## 2025-01-15 DIAGNOSIS — Z36.86 ENCOUNTER FOR ANTENATAL SCREENING FOR CERVICAL LENGTH: ICD-10-CM

## 2025-01-15 DIAGNOSIS — Z3A.15 15 WEEKS GESTATION OF PREGNANCY: ICD-10-CM

## 2025-01-15 DIAGNOSIS — Z98.891 PREVIOUS CESAREAN SECTION: Primary | ICD-10-CM

## 2025-01-15 LAB
GLUCOSE UR STRIP-MCNC: NEGATIVE MG/DL
PROT UR STRIP-MCNC: NEGATIVE MG/DL

## 2025-01-15 NOTE — PROGRESS NOTES
Chief Complaint   Patient presents with    Routine Prenatal Visit     HPI- Pt is 24 y.o.  at 15w3d here for prenatal visit.  She reports that she recently had fever and cold symptoms that lasted for a week.  She expectantly managed her symptoms and she states her symptoms have resolved.  She has no other complaints.    ROS-     - No vaginal bleeding    GI- No abdominal pain    /81   Wt 90.6 kg (199 lb 12.8 oz)   LMP 10/03/2024 (Approximate)   BMI 34.30 kg/m²   Exam - See flow sheet    Fetal heart rate is normal    Assessment-  Diagnoses and all orders for this visit:    Previous  section    Encounter for fetal anatomic survey  -     US Ob 14 + Weeks Single or First Gestation; Future    Encounter for  screening for cervical length  -     US Ob Transvaginal; Future    15 weeks gestation of pregnancy    Patient was offered screening for spina bifida and declines.  She will follow-up in 4 weeks with anatomy ultrasound.

## 2025-02-12 ENCOUNTER — ROUTINE PRENATAL (OUTPATIENT)
Dept: OBSTETRICS AND GYNECOLOGY | Facility: CLINIC | Age: 25
End: 2025-02-12
Payer: COMMERCIAL

## 2025-02-12 VITALS — SYSTOLIC BLOOD PRESSURE: 110 MMHG | BODY MASS INDEX: 35.7 KG/M2 | DIASTOLIC BLOOD PRESSURE: 73 MMHG | WEIGHT: 208 LBS

## 2025-02-12 DIAGNOSIS — Z98.891 PREVIOUS CESAREAN SECTION: Primary | ICD-10-CM

## 2025-02-12 DIAGNOSIS — Z13.89 SCREENING FOR BLOOD OR PROTEIN IN URINE: ICD-10-CM

## 2025-02-12 DIAGNOSIS — Z3A.19 19 WEEKS GESTATION OF PREGNANCY: ICD-10-CM

## 2025-02-12 LAB
GLUCOSE UR STRIP-MCNC: NEGATIVE MG/DL
PROT UR STRIP-MCNC: NEGATIVE MG/DL

## 2025-02-12 NOTE — PROGRESS NOTES
Chief Complaint   Patient presents with    Routine Prenatal Visit     HPI- Pt is 25 y.o.  at 19w3d here for prenatal visit.  She is doing well with no complaints.  She is feeling fetal movement.    ROS-     - No vaginal bleeding    GI- No abdominal pain    /73   Wt 94.3 kg (208 lb)   LMP 10/03/2024 (Approximate)   BMI 35.70 kg/m²   Exam - See flow sheet    Fetal heart rate is normal    Assessment-  Diagnoses and all orders for this visit:    Previous  section    19 weeks gestation of pregnancy    Anatomy ultrasound was performed today and shows normal-appearing fetal anatomy.  Ultrasound was reviewed with her.  She will follow-up in 4 weeks for next prenatal visit.

## 2025-03-12 ENCOUNTER — ROUTINE PRENATAL (OUTPATIENT)
Dept: OBSTETRICS AND GYNECOLOGY | Facility: CLINIC | Age: 25
End: 2025-03-12
Payer: COMMERCIAL

## 2025-03-12 VITALS — BODY MASS INDEX: 36.05 KG/M2 | WEIGHT: 210 LBS | SYSTOLIC BLOOD PRESSURE: 100 MMHG | DIASTOLIC BLOOD PRESSURE: 68 MMHG

## 2025-03-12 DIAGNOSIS — Z98.891 PREVIOUS CESAREAN SECTION: Primary | ICD-10-CM

## 2025-03-12 DIAGNOSIS — O09.299 HISTORY OF OLIGOHYDRAMNIOS IN PRIOR PREGNANCY, CURRENTLY PREGNANT: ICD-10-CM

## 2025-03-12 DIAGNOSIS — Z13.1 SCREENING FOR DIABETES MELLITUS: ICD-10-CM

## 2025-03-12 DIAGNOSIS — Z11.3 SCREEN FOR STD (SEXUALLY TRANSMITTED DISEASE): ICD-10-CM

## 2025-03-12 DIAGNOSIS — Z13.89 SCREENING FOR BLOOD OR PROTEIN IN URINE: ICD-10-CM

## 2025-03-12 DIAGNOSIS — Z3A.23 23 WEEKS GESTATION OF PREGNANCY: ICD-10-CM

## 2025-03-12 DIAGNOSIS — Z13.0 SCREENING FOR IRON DEFICIENCY ANEMIA: ICD-10-CM

## 2025-03-12 LAB
GLUCOSE UR STRIP-MCNC: NEGATIVE MG/DL
PROT UR STRIP-MCNC: NEGATIVE MG/DL

## 2025-03-12 NOTE — PROGRESS NOTES
Chief Complaint   Patient presents with    Routine Prenatal Visit     HPI- Pt is 25 y.o.  at 23w3d here for prenatal visit.  She is doing well with no major complaints.  She does feel lots of fetal movement.    ROS-     - No vaginal bleeding    GI- No abdominal pain    /68   Wt 95.3 kg (210 lb)   LMP 10/03/2024 (Approximate)   BMI 36.05 kg/m²   Exam - See flow sheet    Fetal heart rate is normal    Assessment-  Diagnoses and all orders for this visit:    Previous  section    23 weeks gestation of pregnancy    History of oligohydramnios in prior pregnancy, currently pregnant  -     US Ob Follow Up Transabdominal Approach; Future    Screening for diabetes mellitus  -     Gestational Screen 1 Hr ([facility])    Screening for iron deficiency anemia  -     CBC (No Diff)    Screen for STD (sexually transmitted disease)  -     Treponema pallidum AB w/Reflex RPR    Patient will follow-up in 4 weeks and we will repeat ultrasound with that visit due to history of oligohydramnios.  She will do her glucose test with that visit and instructions were discussed.

## 2025-04-11 ENCOUNTER — ROUTINE PRENATAL (OUTPATIENT)
Dept: OBSTETRICS AND GYNECOLOGY | Facility: CLINIC | Age: 25
End: 2025-04-11
Payer: COMMERCIAL

## 2025-04-11 VITALS — DIASTOLIC BLOOD PRESSURE: 73 MMHG | BODY MASS INDEX: 37.52 KG/M2 | SYSTOLIC BLOOD PRESSURE: 108 MMHG | WEIGHT: 218.6 LBS

## 2025-04-11 DIAGNOSIS — Z34.80 SUPERVISION OF OTHER NORMAL PREGNANCY, ANTEPARTUM: ICD-10-CM

## 2025-04-11 DIAGNOSIS — O09.299 HISTORY OF OLIGOHYDRAMNIOS IN PRIOR PREGNANCY, CURRENTLY PREGNANT: ICD-10-CM

## 2025-04-11 DIAGNOSIS — Z98.891 PREVIOUS CESAREAN SECTION: Primary | ICD-10-CM

## 2025-04-11 DIAGNOSIS — Z13.89 SCREENING FOR BLOOD OR PROTEIN IN URINE: ICD-10-CM

## 2025-04-11 LAB
GLUCOSE UR STRIP-MCNC: NEGATIVE MG/DL
PROT UR STRIP-MCNC: NEGATIVE MG/DL

## 2025-04-11 NOTE — PROGRESS NOTES
Patient or patient representative verbalized consent for the use of Ambient Listening during the visit with  Enriqueta Stokes MD for chart documentation. 2025  16:16 EDT    History of Present Illness  The patient is a 25-year-old -0-0-1 at 27 weeks and 5 days, here for a routine OB visit. Her pregnancy is complicated by a history of oligohydramnios in a prior pregnancy.    Pregnancy Details  - Ultrasound today showed appropriate growth with a baby measuring in the 54th percentile.  - The abdominal circumference was in the 38th percentile.  - EFW was 1148 g or 2 pounds 8 ounces.  - ROXANNE is 14.7.  - Placenta is anterior.  - Baby is breech.  - She has expressed a preference for a scheduled  over a .  - She does not have a scheduled appointment with Dr. Romano and is considering biweekly visits.  - She has not yet planned her  but is contemplating scheduling it at 38 weeks due to her doctor's impending vacation.    Pain and Discomfort  - She reports experiencing cramping and severe back pain, which she attributes to her scoliosis.  - The pain is localized in the middle of her back, near the tailbone, and she describes it as similar to the sensation of a TENS unit.  - She is uncertain if the baby is exerting pressure on a nerve.  - She reports no fever, vomiting, or flank pain suggestive of a kidney infection.    Supplemental information: None      Vitals:    25 1311   BP: 108/73       Physical Exam      Results  - Imaging (Ultrasound):    - Appropriate growth with baby measuring the 54th percentile    - Abdominal circumference: 38th percentile    - Estimated fetal weight (EFW): 1148 g (2 pounds 8 ounces)    - Amniotic fluid index (ROXANNE): 14.7    - Placenta: anterior    - Baby: breech     Diagnosis Plan   1. Previous  section        2. History of oligohydramnios in prior pregnancy, currently pregnant        3. Supervision of other normal pregnancy, antepartum            Assessment  & Plan  1. Routine obstetric visit: Stable. ROXANNE 14.7. EFW 1148 g or 2 pounds 8 ounces. Placenta anterior. Breech position.  - Await glucose test results with target level <135  - Hemoglobin expected to be =10.5  - Syphilis test to be conducted    2. Back pain: Chronic.  - Tylenol for pain management  - Lidocaine patches  - Heating pads on low or medium  - IcyHot patches  - Use of exercise ball  - Cat-cow stretches and child's pose exercises  - Counter pressure for relief    Follow-up  - Follow up in 2 to 3 weeks      Enriqueta Stokes MD  04/11/25 16:16 EDT     Return in about 2 weeks (around 4/25/2025).

## 2025-04-12 LAB
ERYTHROCYTE [DISTWIDTH] IN BLOOD BY AUTOMATED COUNT: 12.4 % (ref 12.3–15.4)
GLUCOSE 1H P 50 G GLC PO SERPL-MCNC: 119 MG/DL (ref 65–139)
HCT VFR BLD AUTO: 34.6 % (ref 34–46.6)
HGB BLD-MCNC: 11.3 G/DL (ref 12–15.9)
MCH RBC QN AUTO: 30.4 PG (ref 26.6–33)
MCHC RBC AUTO-ENTMCNC: 32.7 G/DL (ref 31.5–35.7)
MCV RBC AUTO: 93 FL (ref 79–97)
PLATELET # BLD AUTO: 351 10*3/MM3 (ref 140–450)
RBC # BLD AUTO: 3.72 10*6/MM3 (ref 3.77–5.28)
WBC # BLD AUTO: 13.29 10*3/MM3 (ref 3.4–10.8)

## 2025-04-14 LAB — TREPONEMA PALLIDUM IGG+IGM AB [PRESENCE] IN SERUM OR PLASMA BY IMMUNOASSAY: NON REACTIVE

## 2025-04-23 ENCOUNTER — ROUTINE PRENATAL (OUTPATIENT)
Dept: OBSTETRICS AND GYNECOLOGY | Facility: CLINIC | Age: 25
End: 2025-04-23
Payer: COMMERCIAL

## 2025-04-23 VITALS — BODY MASS INDEX: 38.11 KG/M2 | WEIGHT: 222 LBS | DIASTOLIC BLOOD PRESSURE: 73 MMHG | SYSTOLIC BLOOD PRESSURE: 114 MMHG

## 2025-04-23 DIAGNOSIS — Z3A.29 29 WEEKS GESTATION OF PREGNANCY: ICD-10-CM

## 2025-04-23 DIAGNOSIS — Z13.89 SCREENING FOR BLOOD OR PROTEIN IN URINE: ICD-10-CM

## 2025-04-23 DIAGNOSIS — Z98.891 PREVIOUS CESAREAN SECTION: Primary | ICD-10-CM

## 2025-04-23 DIAGNOSIS — O09.299 HISTORY OF OLIGOHYDRAMNIOS IN PRIOR PREGNANCY, CURRENTLY PREGNANT: ICD-10-CM

## 2025-04-23 DIAGNOSIS — Z23 NEED FOR TDAP VACCINATION: ICD-10-CM

## 2025-04-23 LAB
GLUCOSE UR STRIP-MCNC: NEGATIVE MG/DL
PROT UR STRIP-MCNC: NEGATIVE MG/DL

## 2025-04-23 NOTE — PROGRESS NOTES
Chief Complaint   Patient presents with    Routine Prenatal Visit     HPI- Pt is 25 y.o.  at 29w3d here for prenatal visit.  Patient reports general discomfort like pelvic pain and back pain.  She does report good fetal movement.    ROS-     - No vaginal bleeding    GI- No abdominal pain    /73   Wt 101 kg (222 lb)   LMP 10/03/2024 (Approximate)   BMI 38.11 kg/m²   Exam - See flow sheet    Fetal heart rate is normal    Assessment-  Diagnoses and all orders for this visit:    Previous  section  -     Case Request    History of oligohydramnios in prior pregnancy, currently pregnant    29 weeks gestation of pregnancy    Need for Tdap vaccination    Discussed stretches at home that she can do to help with pregnancy related discomfort.  Ultrasound today shows normal ROXANNE.  Patient desires repeat  section and we will schedule.  She will also receive Tdap vaccine today.  She will follow-up in 2 weeks.

## 2025-04-24 ENCOUNTER — TELEPHONE (OUTPATIENT)
Dept: OBSTETRICS AND GYNECOLOGY | Facility: CLINIC | Age: 25
End: 2025-04-24
Payer: COMMERCIAL

## 2025-05-07 ENCOUNTER — ROUTINE PRENATAL (OUTPATIENT)
Dept: OBSTETRICS AND GYNECOLOGY | Facility: CLINIC | Age: 25
End: 2025-05-07
Payer: COMMERCIAL

## 2025-05-07 VITALS — DIASTOLIC BLOOD PRESSURE: 66 MMHG | WEIGHT: 223 LBS | SYSTOLIC BLOOD PRESSURE: 99 MMHG | BODY MASS INDEX: 38.28 KG/M2

## 2025-05-07 DIAGNOSIS — Z3A.31 31 WEEKS GESTATION OF PREGNANCY: ICD-10-CM

## 2025-05-07 DIAGNOSIS — O09.299 HISTORY OF OLIGOHYDRAMNIOS IN PRIOR PREGNANCY, CURRENTLY PREGNANT: ICD-10-CM

## 2025-05-07 DIAGNOSIS — Z98.891 PREVIOUS CESAREAN SECTION: Primary | ICD-10-CM

## 2025-05-07 DIAGNOSIS — Z13.89 SCREENING FOR BLOOD OR PROTEIN IN URINE: ICD-10-CM

## 2025-05-07 LAB
GLUCOSE UR STRIP-MCNC: NEGATIVE MG/DL
PROT UR STRIP-MCNC: NEGATIVE MG/DL

## 2025-05-07 NOTE — PROGRESS NOTES
Chief Complaint   Patient presents with    Routine Prenatal Visit     HPI- Pt is 25 y.o.  at 31w3d here for prenatal visit.  She is doing well with no concerns.  She reports good fetal movement.    ROS-     - No vaginal bleeding    GI- No abdominal pain    BP 99/66   Wt 101 kg (223 lb)   LMP 10/03/2024 (Approximate)   BMI 38.28 kg/m²   Exam - See flow sheet    Fetal heart rate is normal    Assessment-  Diagnoses and all orders for this visit:    Previous  section    History of oligohydramnios in prior pregnancy, currently pregnant  -     US Ob Follow Up Transabdominal Approach; Future    31 weeks gestation of pregnancy    She will follow-up in 2 weeks with repeat ultrasound to evaluate growth and ROXANNE.  She is scheduled for repeat .

## 2025-05-21 ENCOUNTER — ROUTINE PRENATAL (OUTPATIENT)
Dept: OBSTETRICS AND GYNECOLOGY | Facility: CLINIC | Age: 25
End: 2025-05-21
Payer: COMMERCIAL

## 2025-05-21 VITALS — DIASTOLIC BLOOD PRESSURE: 72 MMHG | SYSTOLIC BLOOD PRESSURE: 107 MMHG | BODY MASS INDEX: 38.11 KG/M2 | WEIGHT: 222 LBS

## 2025-05-21 DIAGNOSIS — Z3A.33 33 WEEKS GESTATION OF PREGNANCY: ICD-10-CM

## 2025-05-21 DIAGNOSIS — Z98.891 PREVIOUS CESAREAN SECTION: Primary | ICD-10-CM

## 2025-05-21 DIAGNOSIS — Z13.89 SCREENING FOR BLOOD OR PROTEIN IN URINE: ICD-10-CM

## 2025-05-21 LAB
GLUCOSE UR STRIP-MCNC: NEGATIVE MG/DL
PROT UR STRIP-MCNC: ABNORMAL MG/DL

## 2025-05-21 NOTE — PROGRESS NOTES
Chief Complaint   Patient presents with    Routine Prenatal Visit     HPI- Pt is 25 y.o.  at 33w3d here for prenatal visit.  She is doing well with no major complaints.  She reports good fetal movement.    ROS-     - No vaginal bleeding    GI- No abdominal pain    /72   Wt 101 kg (222 lb)   LMP 10/03/2024 (Approximate)   BMI 38.11 kg/m²   Exam - See flow sheet    Fetal heart rate is normal    Assessment-  Diagnoses and all orders for this visit:    Previous  section    33 weeks gestation of pregnancy    Growth ultrasound today shows appropriate fetal growth with normal ROXANNE.  Growth ultrasound was reviewed with her.  We will repeat growth in 4 weeks.  She will follow-up in 2 weeks.

## 2025-06-03 NOTE — PROGRESS NOTES
OB VISIT 35 WEEKS      Chief Complaint   Patient presents with    Routine Prenatal Visit     Patient c/o pelvic pressure.         Rosalina is a 25 y.o.  35w3d being seen today for her obstetrical visit.  Patient reports pressure. Fetal movement: normal. The patient currently sees Dr. Miranda.       REVIEW OF SYSTEMS  Cramping/contractions: denies  Vaginal bleeding: denies  Fetal movement: present  Leaking of fluid: denies    Review of Systems   Eyes:  Negative for blurred vision, double vision and visual disturbance.   Gastrointestinal:  Negative for abdominal pain.   Neurological:  Negative for light-headedness and headache.   All other systems reviewed and are negative.      /77   Wt 101 kg (222 lb)   LMP 10/03/2024 (Approximate)   BMI 38.11 kg/m²     Prenatal Assessment  Fetal Heart Rate: 140  Fundal Height (cm): 36 cm  Movement: Present  Presentation: Vertex  Dilation/Effacement/Station  Dilation: Fingertip  Effacement (%): 10  Station: -3  Edema  LLE Edema: None  RLE Edema: None  Facial Edema: None    Physical Exam  Constitutional:       General: She is awake.      Appearance: Normal appearance. She is well-developed and well-groomed.   HENT:      Head: Normocephalic and atraumatic.   Pulmonary:      Effort: Pulmonary effort is normal.   Musculoskeletal:      Cervical back: Normal range of motion.   Neurological:      General: No focal deficit present.      Mental Status: She is alert and oriented to person, place, and time.   Skin:     General: Skin is warm and dry.   Psychiatric:         Mood and Affect: Mood normal.         Behavior: Behavior normal. Behavior is cooperative.   Vitals reviewed.         ASSESSMENT/PLAN    Diagnoses and all orders for this visit:    1. Third trimester pregnancy (Primary)  -     POC Urinalysis Dipstick  -     Strep Gp B Culture+Rfx ([facility]) - Swab, Vaginal/Rectum    2. 35 weeks gestation of pregnancy  -     POC Urinalysis Dipstick  -     Strep Gp B Culture+Rfx  ([facility]) - Swab, Vaginal/Rectum         Urine dip today: negative protein, negative glucose.   Reviewed fetal kick counts.  Reviewed this stage of pregnancy.  GBS today.  Discussed use of belly band for increased pelvic pressure.  Problem list updated.   Is aware of when and where to go into the hospital.    Follow up in 1 week with Dr. Miranda.     I spent 15 minutes caring for Rosalina on this date of service. This time includes time spent by me in the following activities: preparing for the visit, reviewing tests, performing a medically appropriate examination and/or evaluation, counseling and educating the patient/family/caregiver, referring and communicating with other health care professionals, documenting information in the medical record, independently interpreting results and communicating that information with the patient/family/caregiver, care coordination, ordering medications, ordering test(s), ordering procedure(s), obtaining a separately obtained history, and reviewing a separately obtained history.      Eliz Nielsen CNM  6/4/2025  10:53 EDT

## 2025-06-04 ENCOUNTER — ROUTINE PRENATAL (OUTPATIENT)
Dept: OBSTETRICS AND GYNECOLOGY | Facility: CLINIC | Age: 25
End: 2025-06-04
Payer: COMMERCIAL

## 2025-06-04 VITALS — DIASTOLIC BLOOD PRESSURE: 77 MMHG | WEIGHT: 222 LBS | SYSTOLIC BLOOD PRESSURE: 104 MMHG | BODY MASS INDEX: 38.11 KG/M2

## 2025-06-04 DIAGNOSIS — Z3A.35 35 WEEKS GESTATION OF PREGNANCY: ICD-10-CM

## 2025-06-04 DIAGNOSIS — Z34.93 THIRD TRIMESTER PREGNANCY: Primary | ICD-10-CM

## 2025-06-04 LAB
GLUCOSE UR STRIP-MCNC: NEGATIVE MG/DL
PROT UR STRIP-MCNC: NEGATIVE MG/DL

## 2025-06-08 LAB — B-HEM STREP SPEC QL CULT: NEGATIVE

## 2025-06-11 ENCOUNTER — ROUTINE PRENATAL (OUTPATIENT)
Dept: OBSTETRICS AND GYNECOLOGY | Facility: CLINIC | Age: 25
End: 2025-06-11
Payer: COMMERCIAL

## 2025-06-11 VITALS — WEIGHT: 222 LBS | SYSTOLIC BLOOD PRESSURE: 123 MMHG | DIASTOLIC BLOOD PRESSURE: 85 MMHG | BODY MASS INDEX: 38.11 KG/M2

## 2025-06-11 DIAGNOSIS — Z34.93 THIRD TRIMESTER PREGNANCY: Primary | ICD-10-CM

## 2025-06-11 DIAGNOSIS — Z3A.36 36 WEEKS GESTATION OF PREGNANCY: ICD-10-CM

## 2025-06-11 DIAGNOSIS — K59.00 CONSTIPATION, UNSPECIFIED CONSTIPATION TYPE: ICD-10-CM

## 2025-06-11 LAB
GLUCOSE UR STRIP-MCNC: NEGATIVE MG/DL
PROT UR STRIP-MCNC: NEGATIVE MG/DL

## 2025-06-11 RX ORDER — DOCUSATE SODIUM 100 MG/1
100 CAPSULE, LIQUID FILLED ORAL 2 TIMES DAILY
Qty: 60 CAPSULE | Refills: 1 | Status: SHIPPED | OUTPATIENT
Start: 2025-06-11

## 2025-06-11 NOTE — PROGRESS NOTES
OB VISIT 36 WEEKS      Chief Complaint   Patient presents with    Routine Prenatal Visit         Rosalina is a 25 y.o.  36w3d being seen today for her obstetrical visit.  Patient reports no complaints. Fetal movement: normal. The patient currently sees Dr. Miranda.       REVIEW OF SYSTEMS  Cramping/contractions: denies  Vaginal bleeding: denies  Fetal movement: present  Leaking of fluid: denies    Review of Systems   Eyes:  Negative for blurred vision, double vision and visual disturbance.   Gastrointestinal:  Negative for abdominal pain.   Neurological:  Negative for light-headedness and headache.   All other systems reviewed and are negative.      /85   Wt 101 kg (222 lb)   LMP 10/03/2024 (Approximate)   BMI 38.11 kg/m²     Prenatal Assessment  Fetal Heart Rate: 152  Fundal Height (cm): 37 cm  Movement: Present  Presentation: Vertex  Dilation/Effacement/Station  Dilation: Fingertip  Effacement (%): 20  Station: -3  Edema  LLE Edema: None  RLE Edema: None  Facial Edema: None    Physical Exam  Constitutional:       General: She is awake.      Appearance: Normal appearance. She is well-developed and well-groomed.   HENT:      Head: Normocephalic and atraumatic.   Pulmonary:      Effort: Pulmonary effort is normal.   Musculoskeletal:      Cervical back: Normal range of motion.   Neurological:      General: No focal deficit present.      Mental Status: She is alert and oriented to person, place, and time.   Skin:     General: Skin is warm and dry.   Psychiatric:         Mood and Affect: Mood normal.         Behavior: Behavior normal. Behavior is cooperative.   Vitals reviewed.         ASSESSMENT/PLAN    Diagnoses and all orders for this visit:    1. Third trimester pregnancy (Primary)  -     POC Urinalysis Dipstick    2. 36 weeks gestation of pregnancy  -     POC Urinalysis Dipstick    3. Constipation, unspecified constipation type  -     docusate sodium (Colace) 100 MG capsule; Take 1 capsule by mouth 2  (Two) Times a Day.  Dispense: 60 capsule; Refill: 1         Cervical Exam: performed: 0.5 cm,20% , -3.  Urine dip today: negative protein, negative glucose.   GBS swab viewed-negative  Reviewed fetal kick counts  Reviewed this stage of pregnancy  Problem list updated   Is aware of hospital location, when to go in.    Follow up in 1 week with Dr. Miranda.     I spent 15 minutes caring for Rosalina on this date of service. This time includes time spent by me in the following activities: preparing for the visit, reviewing tests, performing a medically appropriate examination and/or evaluation, counseling and educating the patient/family/caregiver, referring and communicating with other health care professionals, documenting information in the medical record, independently interpreting results and communicating that information with the patient/family/caregiver, care coordination, ordering medications, ordering test(s), ordering procedure(s), obtaining a separately obtained history, and reviewing a separately obtained history.     Eliz Nielsen CNM  6/11/2025  14:01 EDT

## 2025-06-11 NOTE — PATIENT INSTRUCTIONS
TIPS    SEXUAL INTERCOURSE  Make sure you lie down for a short amount of time afterwards to make sure the semen gets to your cervix.    YOGA/BIRTHING BALL  Bouncing on the ball and rotating hips can be effective for bringing baby down into the pelvis.    CURB WALKING  Walk with one foot on the curb and one on the ground. Make sure to turn around and walk back with the other foot to keep the hips even.    Alternatively you can do walking on stairs if no curb is available.  DATES  Eat 6 Medjool dates per day, starting at 36 weeks.  If you do not like the consistency of dates you can try Fig Newtons.    Done in the office by a provider  OTHER OPTIONS  Chiropractics  Acupuncture     **Avoid taking mineral/castor oil by itself as seen in old winatalie's tales. It is associated with diarrhea and has not been found to be effective.**

## 2025-06-15 NOTE — PROGRESS NOTES
OB VISIT 37 WEEKS      Chief Complaint   Patient presents with    Routine Prenatal Visit         Rosalina is a 25 y.o.  37w3d being seen today for her obstetrical visit.  Patient reports no complaints. Fetal movement: normal. The patient currently sees Dr. Miranda.       REVIEW OF SYSTEMS  Cramping/contractions: denies  Vaginal bleeding: denies  Fetal movement: present  Leaking of fluid: denies    Review of Systems   Eyes:  Negative for blurred vision, double vision and visual disturbance.   Gastrointestinal:  Negative for abdominal pain.   Neurological:  Negative for light-headedness and headache.   All other systems reviewed and are negative.      /72   Wt 102 kg (225 lb)   LMP 10/03/2024 (Approximate)   BMI 38.62 kg/m²     Prenatal Assessment  Fetal Heart Rate: 144  Fundal Height (cm): 37 cm  Movement: Present  Presentation: Vertex  Dilation/Effacement/Station  Dilation: 1  Effacement (%): 20  Station: -3  Edema  LLE Edema: None  RLE Edema: None  Facial Edema: None    Physical Exam  Constitutional:       General: She is awake.      Appearance: Normal appearance. She is well-developed and well-groomed.   HENT:      Head: Normocephalic and atraumatic.   Pulmonary:      Effort: Pulmonary effort is normal.   Musculoskeletal:      Cervical back: Normal range of motion.   Neurological:      General: No focal deficit present.      Mental Status: She is alert and oriented to person, place, and time.   Skin:     General: Skin is warm and dry.   Psychiatric:         Mood and Affect: Mood normal.         Behavior: Behavior normal. Behavior is cooperative.   Vitals reviewed.         ASSESSMENT/PLAN    Diagnoses and all orders for this visit:    1. Third trimester pregnancy (Primary)  -     POC Urinalysis Dipstick    2. 37 weeks gestation of pregnancy    3. Encounter for screening examination for sexually transmitted disease  -     Hepatitis B Surface Antigen  -     Hepatitis C Antibody  -     HIV-1 / O / 2 Ag /  Antibody  -     HSV 1 & 2 - Specific Antibody, IgG  -     Treponema pallidum AB w/Reflex RPR         Cervical Exam: performed: 1 cm, 0% , -3.    Urine dip today: traceprotein, negative glucose.    section scheduled for 2025.   Reviewed fetal kick counts.  Reviewed this stage of pregnancy.  Problem list updated.   Is aware of hospital location, when to go in.    Follow up in 1 week with Dr. Miranda.     I spent 15 minutes caring for Rosalina on this date of service. This time includes time spent by me in the following activities: preparing for the visit, reviewing tests, performing a medically appropriate examination and/or evaluation, counseling and educating the patient/family/caregiver, referring and communicating with other health care professionals, documenting information in the medical record, independently interpreting results and communicating that information with the patient/family/caregiver, care coordination, ordering medications, ordering test(s), ordering procedure(s), obtaining a separately obtained history, and reviewing a separately obtained history.     Eliz Nielsen CNM  2025  14:07 EDT

## 2025-06-18 ENCOUNTER — ROUTINE PRENATAL (OUTPATIENT)
Dept: OBSTETRICS AND GYNECOLOGY | Facility: CLINIC | Age: 25
End: 2025-06-18
Payer: COMMERCIAL

## 2025-06-18 VITALS — WEIGHT: 225 LBS | DIASTOLIC BLOOD PRESSURE: 72 MMHG | BODY MASS INDEX: 38.62 KG/M2 | SYSTOLIC BLOOD PRESSURE: 111 MMHG

## 2025-06-18 DIAGNOSIS — Z34.93 THIRD TRIMESTER PREGNANCY: Primary | ICD-10-CM

## 2025-06-18 DIAGNOSIS — Z3A.37 37 WEEKS GESTATION OF PREGNANCY: ICD-10-CM

## 2025-06-18 LAB
GLUCOSE UR STRIP-MCNC: NEGATIVE MG/DL
LEUKOCYTE EST, POC: ABNORMAL
PROT UR STRIP-MCNC: ABNORMAL MG/DL

## 2025-06-25 ENCOUNTER — ROUTINE PRENATAL (OUTPATIENT)
Dept: OBSTETRICS AND GYNECOLOGY | Facility: CLINIC | Age: 25
End: 2025-06-25
Payer: COMMERCIAL

## 2025-06-25 VITALS — WEIGHT: 228 LBS | BODY MASS INDEX: 39.14 KG/M2 | SYSTOLIC BLOOD PRESSURE: 108 MMHG | DIASTOLIC BLOOD PRESSURE: 75 MMHG

## 2025-06-25 DIAGNOSIS — Z13.89 SCREENING FOR BLOOD OR PROTEIN IN URINE: ICD-10-CM

## 2025-06-25 DIAGNOSIS — Z3A.38 38 WEEKS GESTATION OF PREGNANCY: ICD-10-CM

## 2025-06-25 DIAGNOSIS — Z98.891 PREVIOUS CESAREAN SECTION: Primary | ICD-10-CM

## 2025-06-25 LAB
GLUCOSE UR STRIP-MCNC: NEGATIVE MG/DL
PROT UR STRIP-MCNC: NEGATIVE MG/DL

## 2025-06-25 PROCEDURE — 0502F SUBSEQUENT PRENATAL CARE: CPT | Performed by: OBSTETRICS & GYNECOLOGY

## 2025-06-25 NOTE — PROGRESS NOTES
Chief Complaint   Patient presents with    Routine Prenatal Visit     HPI- Pt is 25 y.o.  at 38w3d here for prenatal visit.  She is doing well and has no complaints.  She does report good fetal movement.    ROS-     - No vaginal bleeding    GI- No abdominal pain    /75   Wt 103 kg (228 lb)   LMP 10/03/2024 (Approximate)   BMI 39.14 kg/m²   Exam - See flow sheet    Fetal heart rate is normal    Assessment-  Diagnoses and all orders for this visit:    Previous  section    38 weeks gestation of pregnancy    She is scheduled for repeat  on Tuesday.  All questions were answered.

## 2025-07-01 ENCOUNTER — ANESTHESIA (OUTPATIENT)
Dept: LABOR AND DELIVERY | Facility: HOSPITAL | Age: 25
End: 2025-07-01
Payer: COMMERCIAL

## 2025-07-01 ENCOUNTER — HOSPITAL ENCOUNTER (INPATIENT)
Facility: HOSPITAL | Age: 25
LOS: 2 days | Discharge: HOME | End: 2025-07-03
Attending: OBSTETRICS & GYNECOLOGY | Admitting: OBSTETRICS & GYNECOLOGY
Payer: COMMERCIAL

## 2025-07-01 ENCOUNTER — ANESTHESIA EVENT (OUTPATIENT)
Dept: LABOR AND DELIVERY | Facility: HOSPITAL | Age: 25
End: 2025-07-01
Payer: COMMERCIAL

## 2025-07-01 DIAGNOSIS — Z98.891 S/P CESAREAN SECTION: Primary | ICD-10-CM

## 2025-07-01 PROBLEM — Z34.90 PREGNANCY: Status: ACTIVE | Noted: 2025-07-01

## 2025-07-01 PROBLEM — O34.219 PREVIOUS CESAREAN DELIVERY AFFECTING PREGNANCY: Status: ACTIVE | Noted: 2025-07-01

## 2025-07-01 LAB
ABO GROUP BLD: NORMAL
ALBUMIN SERPL-MCNC: 3.6 G/DL (ref 3.5–5.2)
ALBUMIN/GLOB SERPL: 1 G/DL
ALP SERPL-CCNC: 143 U/L (ref 39–117)
ALT SERPL W P-5'-P-CCNC: 6 U/L (ref 1–33)
ANION GAP SERPL CALCULATED.3IONS-SCNC: 12 MMOL/L (ref 5–15)
AST SERPL-CCNC: 16 U/L (ref 1–32)
BASOPHILS # BLD AUTO: 0.03 10*3/MM3 (ref 0–0.2)
BASOPHILS NFR BLD AUTO: 0.3 % (ref 0–1.5)
BILIRUB SERPL-MCNC: 0.3 MG/DL (ref 0–1.2)
BLD GP AB SCN SERPL QL: NEGATIVE
BUN SERPL-MCNC: 5 MG/DL (ref 6–20)
BUN/CREAT SERPL: 7.5 (ref 7–25)
CALCIUM SPEC-SCNC: 9.2 MG/DL (ref 8.6–10.5)
CHLORIDE SERPL-SCNC: 106 MMOL/L (ref 98–107)
CO2 SERPL-SCNC: 18 MMOL/L (ref 22–29)
CREAT SERPL-MCNC: 0.67 MG/DL (ref 0.57–1)
DEPRECATED RDW RBC AUTO: 44.6 FL (ref 37–54)
EGFRCR SERPLBLD CKD-EPI 2021: 124.6 ML/MIN/1.73
EOSINOPHIL # BLD AUTO: 0.18 10*3/MM3 (ref 0–0.4)
EOSINOPHIL NFR BLD AUTO: 1.8 % (ref 0.3–6.2)
ERYTHROCYTE [DISTWIDTH] IN BLOOD BY AUTOMATED COUNT: 13.4 % (ref 12.3–15.4)
GLOBULIN UR ELPH-MCNC: 3.5 GM/DL
GLUCOSE SERPL-MCNC: 96 MG/DL (ref 65–99)
HCT VFR BLD AUTO: 31.7 % (ref 34–46.6)
HGB BLD-MCNC: 10.2 G/DL (ref 12–15.9)
IMM GRANULOCYTES # BLD AUTO: 0.04 10*3/MM3 (ref 0–0.05)
IMM GRANULOCYTES NFR BLD AUTO: 0.4 % (ref 0–0.5)
LYMPHOCYTES # BLD AUTO: 1.89 10*3/MM3 (ref 0.7–3.1)
LYMPHOCYTES NFR BLD AUTO: 19.2 % (ref 19.6–45.3)
MCH RBC QN AUTO: 29.1 PG (ref 26.6–33)
MCHC RBC AUTO-ENTMCNC: 32.2 G/DL (ref 31.5–35.7)
MCV RBC AUTO: 90.6 FL (ref 79–97)
MONOCYTES # BLD AUTO: 0.69 10*3/MM3 (ref 0.1–0.9)
MONOCYTES NFR BLD AUTO: 7 % (ref 5–12)
NEUTROPHILS NFR BLD AUTO: 7.03 10*3/MM3 (ref 1.7–7)
NEUTROPHILS NFR BLD AUTO: 71.3 % (ref 42.7–76)
NRBC BLD AUTO-RTO: 0 /100 WBC (ref 0–0.2)
PLATELET # BLD AUTO: 303 10*3/MM3 (ref 140–450)
PMV BLD AUTO: 10.2 FL (ref 6–12)
POTASSIUM SERPL-SCNC: 3.7 MMOL/L (ref 3.5–5.2)
PROT SERPL-MCNC: 7.1 G/DL (ref 6–8.5)
RBC # BLD AUTO: 3.5 10*6/MM3 (ref 3.77–5.28)
RH BLD: POSITIVE
SODIUM SERPL-SCNC: 136 MMOL/L (ref 136–145)
T&S EXPIRATION DATE: NORMAL
TREPONEMA PALLIDUM IGG+IGM AB [PRESENCE] IN SERUM OR PLASMA BY IMMUNOASSAY: NORMAL
WBC NRBC COR # BLD AUTO: 9.86 10*3/MM3 (ref 3.4–10.8)

## 2025-07-01 PROCEDURE — 86780 TREPONEMA PALLIDUM: CPT | Performed by: OBSTETRICS & GYNECOLOGY

## 2025-07-01 PROCEDURE — 86900 BLOOD TYPING SEROLOGIC ABO: CPT | Performed by: OBSTETRICS & GYNECOLOGY

## 2025-07-01 PROCEDURE — 59510 CESAREAN DELIVERY: CPT | Performed by: OBSTETRICS & GYNECOLOGY

## 2025-07-01 PROCEDURE — 25010000002 ONDANSETRON PER 1 MG: Performed by: ANESTHESIOLOGY

## 2025-07-01 PROCEDURE — 25010000002 CEFAZOLIN PER 500 MG: Performed by: OBSTETRICS & GYNECOLOGY

## 2025-07-01 PROCEDURE — 25010000002 FAMOTIDINE 10 MG/ML SOLUTION: Performed by: ANESTHESIOLOGY

## 2025-07-01 PROCEDURE — 25010000002 HYDROMORPHONE PER 4 MG

## 2025-07-01 PROCEDURE — 25010000002 ONDANSETRON PER 1 MG

## 2025-07-01 PROCEDURE — 86850 RBC ANTIBODY SCREEN: CPT | Performed by: OBSTETRICS & GYNECOLOGY

## 2025-07-01 PROCEDURE — 25010000002 BUPIVACAINE PF 0.75 % SOLUTION: Performed by: ANESTHESIOLOGY

## 2025-07-01 PROCEDURE — 85025 COMPLETE CBC W/AUTO DIFF WBC: CPT | Performed by: OBSTETRICS & GYNECOLOGY

## 2025-07-01 PROCEDURE — 25810000003 LACTATED RINGERS SOLUTION: Performed by: OBSTETRICS & GYNECOLOGY

## 2025-07-01 PROCEDURE — 80053 COMPREHEN METABOLIC PANEL: CPT | Performed by: OBSTETRICS & GYNECOLOGY

## 2025-07-01 PROCEDURE — 25010000002 MORPHINE PER 10 MG: Performed by: ANESTHESIOLOGY

## 2025-07-01 PROCEDURE — 86901 BLOOD TYPING SEROLOGIC RH(D): CPT | Performed by: OBSTETRICS & GYNECOLOGY

## 2025-07-01 PROCEDURE — 25010000002 KETOROLAC TROMETHAMINE PER 15 MG: Performed by: OBSTETRICS & GYNECOLOGY

## 2025-07-01 PROCEDURE — 25810000003 LACTATED RINGERS PER 1000 ML: Performed by: OBSTETRICS & GYNECOLOGY

## 2025-07-01 RX ORDER — HYDROXYZINE HYDROCHLORIDE 50 MG/1
50 TABLET, FILM COATED ORAL EVERY 6 HOURS PRN
Status: DISCONTINUED | OUTPATIENT
Start: 2025-07-01 | End: 2025-07-03 | Stop reason: HOSPADM

## 2025-07-01 RX ORDER — ONDANSETRON 2 MG/ML
4 INJECTION INTRAMUSCULAR; INTRAVENOUS ONCE AS NEEDED
Status: COMPLETED | OUTPATIENT
Start: 2025-07-01 | End: 2025-07-01

## 2025-07-01 RX ORDER — LIDOCAINE HYDROCHLORIDE 10 MG/ML
0.5 INJECTION, SOLUTION INFILTRATION; PERINEURAL ONCE AS NEEDED
Status: DISCONTINUED | OUTPATIENT
Start: 2025-07-01 | End: 2025-07-01 | Stop reason: HOSPADM

## 2025-07-01 RX ORDER — KETOROLAC TROMETHAMINE 30 MG/ML
30 INJECTION, SOLUTION INTRAMUSCULAR; INTRAVENOUS ONCE
Status: COMPLETED | OUTPATIENT
Start: 2025-07-01 | End: 2025-07-01

## 2025-07-01 RX ORDER — BUPIVACAINE HCL/0.9 % NACL/PF 0.125 %
PLASTIC BAG, INJECTION (ML) EPIDURAL AS NEEDED
Status: DISCONTINUED | OUTPATIENT
Start: 2025-07-01 | End: 2025-07-01 | Stop reason: SURG

## 2025-07-01 RX ORDER — SODIUM CHLORIDE 0.9 % (FLUSH) 0.9 %
10 SYRINGE (ML) INJECTION EVERY 12 HOURS SCHEDULED
Status: DISCONTINUED | OUTPATIENT
Start: 2025-07-01 | End: 2025-07-01

## 2025-07-01 RX ORDER — DIPHENHYDRAMINE HYDROCHLORIDE 50 MG/ML
25 INJECTION, SOLUTION INTRAMUSCULAR; INTRAVENOUS EVERY 4 HOURS PRN
Status: DISCONTINUED | OUTPATIENT
Start: 2025-07-01 | End: 2025-07-03 | Stop reason: HOSPADM

## 2025-07-01 RX ORDER — FAMOTIDINE 10 MG/ML
20 INJECTION, SOLUTION INTRAVENOUS ONCE AS NEEDED
Status: COMPLETED | OUTPATIENT
Start: 2025-07-01 | End: 2025-07-01

## 2025-07-01 RX ORDER — HYDROMORPHONE HYDROCHLORIDE 1 MG/ML
0.5 INJECTION, SOLUTION INTRAMUSCULAR; INTRAVENOUS; SUBCUTANEOUS
Status: DISCONTINUED | OUTPATIENT
Start: 2025-07-01 | End: 2025-07-01 | Stop reason: HOSPADM

## 2025-07-01 RX ORDER — ONDANSETRON 2 MG/ML
4 INJECTION INTRAMUSCULAR; INTRAVENOUS ONCE AS NEEDED
Status: DISCONTINUED | OUTPATIENT
Start: 2025-07-01 | End: 2025-07-03 | Stop reason: HOSPADM

## 2025-07-01 RX ORDER — DROPERIDOL 2.5 MG/ML
0.62 INJECTION, SOLUTION INTRAMUSCULAR; INTRAVENOUS
Status: DISCONTINUED | OUTPATIENT
Start: 2025-07-01 | End: 2025-07-03 | Stop reason: HOSPADM

## 2025-07-01 RX ORDER — NALOXONE HCL 0.4 MG/ML
0.2 VIAL (ML) INJECTION
Status: DISCONTINUED | OUTPATIENT
Start: 2025-07-01 | End: 2025-07-01

## 2025-07-01 RX ORDER — METHYLERGONOVINE MALEATE 0.2 MG/ML
200 INJECTION INTRAVENOUS ONCE AS NEEDED
Status: CANCELLED | OUTPATIENT
Start: 2025-07-01

## 2025-07-01 RX ORDER — ACETAMINOPHEN 325 MG/1
650 TABLET ORAL EVERY 6 HOURS
Status: DISCONTINUED | OUTPATIENT
Start: 2025-07-02 | End: 2025-07-03 | Stop reason: HOSPADM

## 2025-07-01 RX ORDER — KETOROLAC TROMETHAMINE 15 MG/ML
15 INJECTION, SOLUTION INTRAMUSCULAR; INTRAVENOUS EVERY 6 HOURS
Status: COMPLETED | OUTPATIENT
Start: 2025-07-01 | End: 2025-07-02

## 2025-07-01 RX ORDER — LIDOCAINE HYDROCHLORIDE 10 MG/ML
0.5 INJECTION, SOLUTION INFILTRATION; PERINEURAL ONCE AS NEEDED
Status: DISCONTINUED | OUTPATIENT
Start: 2025-07-01 | End: 2025-07-01

## 2025-07-01 RX ORDER — HYDROCORTISONE 25 MG/G
CREAM TOPICAL 3 TIMES DAILY PRN
Status: DISCONTINUED | OUTPATIENT
Start: 2025-07-01 | End: 2025-07-03 | Stop reason: HOSPADM

## 2025-07-01 RX ORDER — OXYTOCIN/0.9 % SODIUM CHLORIDE 30/500 ML
250 PLASTIC BAG, INJECTION (ML) INTRAVENOUS CONTINUOUS
Status: CANCELLED | OUTPATIENT
Start: 2025-07-01 | End: 2025-07-01

## 2025-07-01 RX ORDER — ACETAMINOPHEN 500 MG
1000 TABLET ORAL EVERY 6 HOURS
Status: DISPENSED | OUTPATIENT
Start: 2025-07-01 | End: 2025-07-02

## 2025-07-01 RX ORDER — IBUPROFEN 600 MG/1
600 TABLET, FILM COATED ORAL EVERY 6 HOURS
Status: DISCONTINUED | OUTPATIENT
Start: 2025-07-02 | End: 2025-07-03 | Stop reason: HOSPADM

## 2025-07-01 RX ORDER — ONDANSETRON 2 MG/ML
4 INJECTION INTRAMUSCULAR; INTRAVENOUS EVERY 6 HOURS PRN
Status: DISCONTINUED | OUTPATIENT
Start: 2025-07-01 | End: 2025-07-03 | Stop reason: HOSPADM

## 2025-07-01 RX ORDER — OXYTOCIN/0.9 % SODIUM CHLORIDE 30/500 ML
125 PLASTIC BAG, INJECTION (ML) INTRAVENOUS ONCE AS NEEDED
Status: COMPLETED | OUTPATIENT
Start: 2025-07-01 | End: 2025-07-01

## 2025-07-01 RX ORDER — PRENATAL VIT/IRON FUM/FOLIC AC 27MG-0.8MG
1 TABLET ORAL DAILY
Status: DISCONTINUED | OUTPATIENT
Start: 2025-07-01 | End: 2025-07-03 | Stop reason: HOSPADM

## 2025-07-01 RX ORDER — SODIUM CHLORIDE, SODIUM LACTATE, POTASSIUM CHLORIDE, CALCIUM CHLORIDE 600; 310; 30; 20 MG/100ML; MG/100ML; MG/100ML; MG/100ML
125 INJECTION, SOLUTION INTRAVENOUS CONTINUOUS
Status: ACTIVE | OUTPATIENT
Start: 2025-07-01 | End: 2025-07-02

## 2025-07-01 RX ORDER — ENOXAPARIN SODIUM 100 MG/ML
40 INJECTION SUBCUTANEOUS NIGHTLY
Status: DISCONTINUED | OUTPATIENT
Start: 2025-07-02 | End: 2025-07-03 | Stop reason: HOSPADM

## 2025-07-01 RX ORDER — OXYTOCIN/0.9 % SODIUM CHLORIDE 30/500 ML
250 PLASTIC BAG, INJECTION (ML) INTRAVENOUS CONTINUOUS
Status: DISPENSED | OUTPATIENT
Start: 2025-07-01 | End: 2025-07-01

## 2025-07-01 RX ORDER — PROMETHAZINE HYDROCHLORIDE 12.5 MG/1
12.5 TABLET ORAL EVERY 4 HOURS PRN
Status: DISCONTINUED | OUTPATIENT
Start: 2025-07-01 | End: 2025-07-03 | Stop reason: HOSPADM

## 2025-07-01 RX ORDER — OXYCODONE HYDROCHLORIDE 10 MG/1
10 TABLET ORAL EVERY 4 HOURS PRN
Status: DISCONTINUED | OUTPATIENT
Start: 2025-07-01 | End: 2025-07-03 | Stop reason: HOSPADM

## 2025-07-01 RX ORDER — ONDANSETRON 4 MG/1
4 TABLET, ORALLY DISINTEGRATING ORAL EVERY 8 HOURS PRN
Status: DISCONTINUED | OUTPATIENT
Start: 2025-07-01 | End: 2025-07-03 | Stop reason: HOSPADM

## 2025-07-01 RX ORDER — METHYLERGONOVINE MALEATE 0.2 MG/ML
200 INJECTION INTRAVENOUS ONCE AS NEEDED
Status: DISCONTINUED | OUTPATIENT
Start: 2025-07-01 | End: 2025-07-01

## 2025-07-01 RX ORDER — SODIUM CHLORIDE, SODIUM LACTATE, POTASSIUM CHLORIDE, CALCIUM CHLORIDE 600; 310; 30; 20 MG/100ML; MG/100ML; MG/100ML; MG/100ML
125 INJECTION, SOLUTION INTRAVENOUS CONTINUOUS
Status: DISCONTINUED | OUTPATIENT
Start: 2025-07-01 | End: 2025-07-01

## 2025-07-01 RX ORDER — OXYTOCIN/0.9 % SODIUM CHLORIDE 30/500 ML
999 PLASTIC BAG, INJECTION (ML) INTRAVENOUS ONCE
Status: COMPLETED | OUTPATIENT
Start: 2025-07-01 | End: 2025-07-01

## 2025-07-01 RX ORDER — DIPHENHYDRAMINE HCL 25 MG
25 CAPSULE ORAL EVERY 4 HOURS PRN
Status: DISCONTINUED | OUTPATIENT
Start: 2025-07-01 | End: 2025-07-03 | Stop reason: HOSPADM

## 2025-07-01 RX ORDER — TRANEXAMIC ACID 10 MG/ML
1000 INJECTION, SOLUTION INTRAVENOUS ONCE AS NEEDED
Status: CANCELLED | OUTPATIENT
Start: 2025-07-01

## 2025-07-01 RX ORDER — SODIUM CHLORIDE 9 MG/ML
40 INJECTION, SOLUTION INTRAVENOUS AS NEEDED
Status: DISCONTINUED | OUTPATIENT
Start: 2025-07-01 | End: 2025-07-01 | Stop reason: HOSPADM

## 2025-07-01 RX ORDER — MISOPROSTOL 200 UG/1
800 TABLET ORAL AS NEEDED
Status: DISCONTINUED | OUTPATIENT
Start: 2025-07-01 | End: 2025-07-01

## 2025-07-01 RX ORDER — KETOROLAC TROMETHAMINE 30 MG/ML
30 INJECTION, SOLUTION INTRAMUSCULAR; INTRAVENOUS ONCE
Status: CANCELLED | OUTPATIENT
Start: 2025-07-01 | End: 2025-07-01

## 2025-07-01 RX ORDER — MORPHINE SULFATE 2 MG/ML
2 INJECTION, SOLUTION INTRAMUSCULAR; INTRAVENOUS
Status: ACTIVE | OUTPATIENT
Start: 2025-07-01 | End: 2025-07-01

## 2025-07-01 RX ORDER — EPHEDRINE SULFATE 50 MG/ML
INJECTION INTRAVENOUS AS NEEDED
Status: DISCONTINUED | OUTPATIENT
Start: 2025-07-01 | End: 2025-07-01 | Stop reason: SURG

## 2025-07-01 RX ORDER — CARBOPROST TROMETHAMINE 250 UG/ML
250 INJECTION, SOLUTION INTRAMUSCULAR
Status: CANCELLED | OUTPATIENT
Start: 2025-07-01

## 2025-07-01 RX ORDER — MISOPROSTOL 200 UG/1
800 TABLET ORAL ONCE AS NEEDED
Status: CANCELLED | OUTPATIENT
Start: 2025-07-01

## 2025-07-01 RX ORDER — SODIUM CHLORIDE 0.9 % (FLUSH) 0.9 %
10 SYRINGE (ML) INJECTION AS NEEDED
Status: DISCONTINUED | OUTPATIENT
Start: 2025-07-01 | End: 2025-07-01 | Stop reason: HOSPADM

## 2025-07-01 RX ORDER — CARBOPROST TROMETHAMINE 250 UG/ML
250 INJECTION, SOLUTION INTRAMUSCULAR AS NEEDED
Status: DISCONTINUED | OUTPATIENT
Start: 2025-07-01 | End: 2025-07-01

## 2025-07-01 RX ORDER — SODIUM CHLORIDE 0.9 % (FLUSH) 0.9 %
10 SYRINGE (ML) INJECTION AS NEEDED
Status: DISCONTINUED | OUTPATIENT
Start: 2025-07-01 | End: 2025-07-01

## 2025-07-01 RX ORDER — SODIUM CHLORIDE 0.9 % (FLUSH) 0.9 %
10 SYRINGE (ML) INJECTION EVERY 12 HOURS SCHEDULED
Status: DISCONTINUED | OUTPATIENT
Start: 2025-07-01 | End: 2025-07-01 | Stop reason: HOSPADM

## 2025-07-01 RX ORDER — MORPHINE SULFATE 4 MG/ML
INJECTION, SOLUTION INTRAMUSCULAR; INTRAVENOUS
Status: COMPLETED | OUTPATIENT
Start: 2025-07-01 | End: 2025-07-01

## 2025-07-01 RX ORDER — BUPIVACAINE HYDROCHLORIDE 7.5 MG/ML
INJECTION, SOLUTION EPIDURAL; RETROBULBAR
Status: COMPLETED | OUTPATIENT
Start: 2025-07-01 | End: 2025-07-01

## 2025-07-01 RX ORDER — OXYCODONE HYDROCHLORIDE 5 MG/1
5 TABLET ORAL EVERY 4 HOURS PRN
Status: DISCONTINUED | OUTPATIENT
Start: 2025-07-01 | End: 2025-07-03 | Stop reason: HOSPADM

## 2025-07-01 RX ORDER — SODIUM CHLORIDE 9 MG/ML
40 INJECTION, SOLUTION INTRAVENOUS AS NEEDED
Status: DISCONTINUED | OUTPATIENT
Start: 2025-07-01 | End: 2025-07-01

## 2025-07-01 RX ORDER — ACETAMINOPHEN 500 MG
1000 TABLET ORAL ONCE
Status: COMPLETED | OUTPATIENT
Start: 2025-07-01 | End: 2025-07-01

## 2025-07-01 RX ORDER — ACETAMINOPHEN 500 MG
1000 TABLET ORAL ONCE
Status: DISCONTINUED | OUTPATIENT
Start: 2025-07-01 | End: 2025-07-01 | Stop reason: HOSPADM

## 2025-07-01 RX ORDER — DOCUSATE SODIUM 100 MG/1
100 CAPSULE, LIQUID FILLED ORAL 2 TIMES DAILY PRN
Status: DISCONTINUED | OUTPATIENT
Start: 2025-07-01 | End: 2025-07-03 | Stop reason: HOSPADM

## 2025-07-01 RX ORDER — OXYTOCIN/0.9 % SODIUM CHLORIDE 30/500 ML
999 PLASTIC BAG, INJECTION (ML) INTRAVENOUS ONCE
Status: CANCELLED | OUTPATIENT
Start: 2025-07-01 | End: 2025-07-01

## 2025-07-01 RX ORDER — ONDANSETRON 2 MG/ML
INJECTION INTRAMUSCULAR; INTRAVENOUS AS NEEDED
Status: DISCONTINUED | OUTPATIENT
Start: 2025-07-01 | End: 2025-07-01 | Stop reason: SURG

## 2025-07-01 RX ADMIN — SODIUM CHLORIDE, POTASSIUM CHLORIDE, SODIUM LACTATE AND CALCIUM CHLORIDE: 600; 310; 30; 20 INJECTION, SOLUTION INTRAVENOUS at 10:47

## 2025-07-01 RX ADMIN — Medication 125 ML/HR: at 12:39

## 2025-07-01 RX ADMIN — FAMOTIDINE 20 MG: 10 INJECTION INTRAVENOUS at 10:36

## 2025-07-01 RX ADMIN — CEFAZOLIN 2000 MG: 2 INJECTION, POWDER, FOR SOLUTION INTRAMUSCULAR; INTRAVENOUS at 10:36

## 2025-07-01 RX ADMIN — Medication 100 MCG: at 11:00

## 2025-07-01 RX ADMIN — KETOROLAC TROMETHAMINE 15 MG: 15 INJECTION INTRAMUSCULAR at 20:51

## 2025-07-01 RX ADMIN — Medication 100 MCG: at 11:03

## 2025-07-01 RX ADMIN — ONDANSETRON 4 MG: 2 INJECTION, SOLUTION INTRAMUSCULAR; INTRAVENOUS at 10:48

## 2025-07-01 RX ADMIN — EPHEDRINE SULFATE 10 MG: 50 INJECTION INTRAVENOUS at 11:03

## 2025-07-01 RX ADMIN — HYDROMORPHONE HYDROCHLORIDE 0.5 MG: 1 INJECTION, SOLUTION INTRAMUSCULAR; INTRAVENOUS; SUBCUTANEOUS at 12:48

## 2025-07-01 RX ADMIN — DOCUSATE SODIUM 100 MG: 100 CAPSULE, LIQUID FILLED ORAL at 20:51

## 2025-07-01 RX ADMIN — ACETAMINOPHEN 1000 MG: 500 TABLET, FILM COATED ORAL at 17:14

## 2025-07-01 RX ADMIN — ACETAMINOPHEN 1000 MG: 500 TABLET, FILM COATED ORAL at 10:36

## 2025-07-01 RX ADMIN — BUPIVACAINE HYDROCHLORIDE 1.4 ML: 7.5 INJECTION, SOLUTION EPIDURAL; RETROBULBAR at 10:59

## 2025-07-01 RX ADMIN — EPHEDRINE SULFATE 10 MG: 50 INJECTION INTRAVENOUS at 11:00

## 2025-07-01 RX ADMIN — Medication 100 MCG: at 11:02

## 2025-07-01 RX ADMIN — KETOROLAC TROMETHAMINE 30 MG: 30 INJECTION INTRAMUSCULAR; INTRAVENOUS at 11:44

## 2025-07-01 RX ADMIN — MORPHINE SULFATE 150 MCG: 4 INJECTION, SOLUTION INTRAMUSCULAR; INTRAVENOUS at 10:59

## 2025-07-01 RX ADMIN — SODIUM CHLORIDE, POTASSIUM CHLORIDE, SODIUM LACTATE AND CALCIUM CHLORIDE 1000 ML: 600; 310; 30; 20 INJECTION, SOLUTION INTRAVENOUS at 09:00

## 2025-07-01 RX ADMIN — ONDANSETRON 4 MG: 2 INJECTION, SOLUTION INTRAMUSCULAR; INTRAVENOUS at 08:54

## 2025-07-01 RX ADMIN — Medication 999 ML/HR: at 11:18

## 2025-07-01 NOTE — LACTATION NOTE
P2. Term LGA baby. Baby's BGMs are normal so far. Pt reports baby has been BF good. Good feedings marked on clipboard. Encouraged to BF at least every 2-3 hours for 10-15 min on each breast. Pt has personal breast pump. Family in room at this time. Encouraged to call LC as needed.

## 2025-07-01 NOTE — ANESTHESIA PREPROCEDURE EVALUATION
Anesthesia Evaluation     Patient summary reviewed and Nursing notes reviewed   NPO Solid Status: > 6 hours  NPO Liquid Status: > 2 hours           Airway   Mallampati: I  TM distance: >3 FB  Neck ROM: full  Dental - normal exam     Pulmonary    (-) COPD, asthma, not a smoker  Cardiovascular   Exercise tolerance: good (4-7 METS)    (-) past MI, dysrhythmias, angina      Neuro/Psych  (+) psychiatric history Anxiety and ADHD  (-) seizures, CVA    ROS Comment: Chiari 1 malformation. Mild, asymptomatic.    Scoliosis, mild.   GI/Hepatic/Renal/Endo    (-) GERD, liver disease, no renal disease, diabetes, no thyroid disorder    Musculoskeletal     Abdominal    Substance History      OB/GYN    (+) Pregnant        Other                    Anesthesia Plan    ASA 2     spinal     (39w2d)    Anesthetic plan, risks, benefits, and alternatives have been provided, discussed and informed consent has been obtained with: patient.    CODE STATUS:    Code Status (Patient has no pulse and is not breathing): CPR (Attempt to Resuscitate)  Medical Interventions (Patient has pulse or is breathing): Full Support  Level Of Support Discussed With: Patient

## 2025-07-01 NOTE — ANESTHESIA POSTPROCEDURE EVALUATION
Patient: Rosalina Pardo    Procedure Summary       Date: 25 Room / Location:  [facility]   [facility]    Anesthesia Start: 1046 Anesthesia Stop: 1150    Procedure:  SECTION REPEAT (Abdomen) Diagnosis:       Previous  section      (Previous  section [Z98.891])    Surgeons: Akila Miranda MD Provider: Eduard Herron MD    Anesthesia Type: spinal ASA Status: 2            Anesthesia Type: spinal    Vitals  Vitals Value Taken Time   /60 25 13:45   Temp 36.7 °C (98.1 °F) 25 13:14   Pulse 84 25 13:45   Resp 16 25 13:45   SpO2 99 % 25 13:45   Vitals shown include unfiled device data.        Anesthesia Post Evaluation

## 2025-07-01 NOTE — OP NOTE
Repeat low-transverse  section    Indications: previous  section low transverse    Pre-operative Diagnosis: 1. 39 week 2 day pregnancy.  2.  Previous  section, desires repeat    Post-operative Diagnosis: same    Surgeon: Akila Miranda MD     Assistants: Assistant: Felipe Gonsales MD was responsible for performing the following activities: Retraction, Suction, Irrigation, and Delivery of Fetus and their skilled assistance was necessary for the success of this case.    Anesthesia: Spinal anesthesia    Procedure Details   The patient was seen in the Holding Room. The risks, benefits, complications, treatment options, and expected outcomes were discussed with the patient.  The patient concurred with the proposed plan, giving informed consent.  The site of surgery properly noted/marked. The patient was taken to Operating Room # 1, identified as Rosalina Pardo and the procedure verified as  Delivery. A Time Out was held and the above information confirmed.    After induction of anesthesia, the patient was draped and prepped in the usual sterile manner. A Pfannenstiel incision was made and carried down through the subcutaneous tissue to the fascia. Fascial incision was made and extended transversely. The fascia was  from the underlying rectus tissue superiorly and inferiorly. The peritoneum was identified and entered. Peritoneal incision was extended longitudinally. The utero-vesical peritoneal reflection was incised transversely and the bladder flap was bluntly freed from the lower uterine segment. A low transverse uterine incision was made. Delivered from vertex presentation was a 3880 gram female with Apgar scores of 8 at one minute and 9 at five minutes. After the umbilical cord was clamped and cut cord blood was obtained for evaluation. The placenta was removed intact and appeared normal. Uterus was exteriorized.  The uterine outline, tubes and ovaries appeared normal.  The uterine incision was closed with running locked sutures of 0 Vicryl. A second imbricating layer was placed using 0 Vicryl in a running fashion.  1 further figure-of-eight stitch using 0 Vicryl was placed at the hysterotomy for further hemostasis.  Hemostasis was observed. Lavage was carried out until clear. Uterus was returned to the patient's abdomen.  Hysterotomy was revisualized and remained hemostatic.  The fascia was then reapproximated with running sutures of 0 Vicryl. The subcutaneous layer was reapproximated with 3-0 Vicryl in a running fashion.  The skin was reapproximated with 4-0 monocryl and dermabond.    Instrument, sponge, and needle counts were correct prior the abdominal closure and at the conclusion of the case.       Findings:  Normal pelvic anatomy    Quantitative Blood Loss: 254 mL           Drains: Benitez, draining clear urine           Specimens: Placenta           Implants: None           Complications:  None; patient tolerated the procedure well.           Disposition: PACU - hemodynamically stable.           Condition: stable    Attending Attestation: I was present and scrubbed for the entire procedure.

## 2025-07-01 NOTE — ANESTHESIA PROCEDURE NOTES
Spinal Block      Patient reassessed immediately prior to procedure    Patient location during procedure: OR  Start Time: 7/1/2025 10:52 AM  Stop Time: 7/1/2025 10:59 AM  Indication:at surgeon's request, post-op pain management and procedure for pain  Performed By  Anesthesiologist: Eduard Herron MD  CRNA/CAA: Estefany Soria CRNA  Preanesthetic Checklist  Completed: patient identified, IV checked, site marked, risks and benefits discussed, surgical consent, monitors and equipment checked, pre-op evaluation and timeout performed  Spinal Block Prep:  Patient Position:sitting  Sterile Tech:cap, gloves, sterile barriers and mask  Prep:Chloraprep  Patient Monitoring:EKG, continuous pulse oximetry and blood pressure monitoring    Spinal Block Procedure  Approach:midline  Guidance:landmark technique  Location:L4-L5  Needle Type:Pencan  Needle Gauge:24 G  Placement of Spinal needle event:cerebrospinal fluid aspirated and paresthesia  Paresthesia: Persistent  Fluid Appearance:clear  Medications: Morphine sulfate (PF) injection - Spinal   150 mcg - 7/1/2025 10:59:00 AM  bupivacaine PF (MARCAINE) 0.75 % injection - Spinal   1.4 mL - 7/1/2025 10:59:00 AM   Post Assessment  Patient Tolerance:patient tolerated the procedure well with no apparent complications  Complications no

## 2025-07-01 NOTE — H&P
[facility]  Obstetric History and Physical    Chief Complaint   Patient presents with    Scheduled        Subjective     Patient is a 25 y.o. female  currently at 39w2d, who presents for repeat .  Patient has a history of 1 previous  section and desires repeat.  This pregnancy has been uncomplicated.    The following portions of the patients history were reviewed and updated as appropriate: current medications, allergies, past medical history, past surgical history, past family history, past social history, and problem list .       Prenatal Information:  Prenatal Results       Initial Prenatal Labs       Test Value Reference Range Date Time    Hemoglobin  13.2 g/dL 11.1 - 15.9 24 1327    Hematocrit  40.2 % 34.0 - 46.6 24 1327    Platelets  411 x10E3/uL 150 - 450 24 1327    Rubella IgG  1.44 index Immune >0.99 24 1327    Hepatitis B SAg  Negative  Negative 24 1327    Hepatitis C Ab        RPR        T. Pallidum Ab   Non-Reactive  Non-Reactive 25 0839       Non Reactive  Non Reactive 25 1306       Non Reactive  Non Reactive 24 1327    ABO  O   25 0839    Rh  Positive   25 0839    Antibody Screen  Negative  Negative 24 1327    HIV  Non Reactive  Non Reactive 24 1327    Urine Culture  Final report   24 1316    Gonorrhea  Negative  Negative 24 1316    Chlamydia  Negative  Negative 24 1316    TSH        HgB A1c         Varicella IgG        Hemoglobinopathy Fractionation        Hemoglobinopathy (genetic testing)        Cystic fibrosis         Spinal muscular atrophy        Fragile X                  Fetal testing        Test Value Reference Range Date Time    NIPT        MSAFP        AFP-4                  2nd and 3rd Trimester       Test Value Reference Range Date Time    Hemoglobin (repeated)  10.2 g/dL 12.0 - 15.9 25 0839       11.3 g/dL 12.0 - 15.9 25 1306    Hematocrit (repeated)   31.7 % 34.0 - 46.6 07/01/25 0839       34.6 % 34.0 - 46.6 04/11/25 1306    Platelets   303 10*3/mm3 140 - 450 07/01/25 0839       351 10*3/mm3 140 - 450 04/11/25 1306       411 x10E3/uL 150 - 450 11/20/24 1327    1 hour GTT   119 mg/dL 65 - 139 04/11/25 1306    Antibody Screen (repeated)  Negative   07/01/25 0839    3rd TM syphilis scrn (repeated)  RPR         3rd TM syphilis scrn (repeated) TP-Ab  Non-Reactive  Non-Reactive 07/01/25 0839       Non Reactive  Non Reactive 04/11/25 1306    3rd TM syphilis screen TB-Ab (FTA)  Non-Reactive  Non-Reactive 07/01/25 0839       Non Reactive  Non Reactive 04/11/25 1306    Syphilis cascade test TP-Ab (EIA)        Syphilis cascade TPPA        GTT Fasting        GTT 1 Hr        GTT 2 Hr        GTT 3 Hr        Group B Strep  Negative  Negative 06/04/25 1054              Other testing        Test Value Reference Range Date Time    Parvo IgG         CMV IgG                   Drug Screening       Test Value Reference Range Date Time    Amphetamine Screen  Negative ng/mL Vvchkh=3851 11/20/24 1316    Barbiturate Screen  Negative ng/mL Tylvvc=299 11/20/24 1316    Benzodiazepine Screen  Negative ng/mL Rfaxql=463 11/20/24 1316    Methadone Screen  Negative ng/mL Xboyof=263 11/20/24 1316    Phencyclidine Screen  Negative ng/mL Cutoff=25 11/20/24 1316    Opiates Screen  Negative ng/mL Ygppog=026 11/20/24 1316    THC Screen  Negative ng/mL Cutoff=50 11/20/24 1316    Cocaine Screen  Negative ng/mL Ojvzaq=384 11/20/24 1316    Propoxyphene Screen  Negative ng/mL Ncrzmm=140 11/20/24 1316    Buprenorphine Screen        Methamphetamine Screen        Oxycodone Screen        Tricyclic Antidepressants Screen                  Legend    ^: Historical                          External Prenatal Results       Pregnancy Outside Results - Transcribed From Office Records - See Scanned Records For Details       Test Value Date Time    ABO  O  07/01/25 0839    Rh  Positive  07/01/25 0839    Antibody Screen   Negative  07/01/25 0839       Negative  11/20/24 1327    Varicella IgG       Rubella  1.44 index 11/20/24 1327    Hgb  10.2 g/dL 07/01/25 0839       11.3 g/dL 04/11/25 1306       13.2 g/dL 11/20/24 1327    Hct  31.7 % 07/01/25 0839       34.6 % 04/11/25 1306       40.2 % 11/20/24 1327    HgB A1c        1h GTT  119 mg/dL 04/11/25 1306    3h GTT Fasting       3h GTT 1 hour       3h GTT 2 hour       3h GTT 3 hour        Gonorrhea (discrete)  Negative  11/20/24 1316    Chlamydia (discrete)  Negative  11/20/24 1316    RPR       Syphils cascade: TP-Ab (FTA)  Non-Reactive  07/01/25 0839       Non Reactive  04/11/25 1306       Non Reactive  11/20/24 1327    TP-Ab  Non-Reactive  07/01/25 0839       Non Reactive  04/11/25 1306       Non Reactive  11/20/24 1327    TP-Ab (EIA)       TPPA       HBsAg  Negative  11/20/24 1327    Herpes Simplex Virus PCR       Herpes Simplex VIrus Culture       HIV  Non Reactive  11/20/24 1327    Hep C RNA Quant PCR       Hep C Antibody       AFP       NIPT       Cystic Fibrosis (Aleksandar)       Cystic Fibroisis        Spinal Muscular atrophy       Fragile X       Group B Strep  Negative  06/04/25 1054    GBS Susceptibility to Clindamycin       GBS Susceptibility to Erythromycin       Fetal Fibronectin       Genetic Testing, Maternal Blood                 Drug Screening       Test Value Date Time    Urine Drug Screen       Amphetamine Screen  Negative ng/mL 11/20/24 1316    Barbiturate Screen  Negative ng/mL 11/20/24 1316    Benzodiazepine Screen  Negative ng/mL 11/20/24 1316    Methadone Screen  Negative ng/mL 11/20/24 1316    Phencyclidine Screen  Negative ng/mL 11/20/24 1316    Opiates Screen       THC Screen       Cocaine Screen       Propoxyphene Screen  Negative ng/mL 11/20/24 1316    Buprenorphine Screen       Methamphetamine Screen       Oxycodone Screen       Tricyclic Antidepressants Screen                 Legend    ^: Historical                             Past OB History:     OB History     Para Term  AB Living   2 1 1 0 0 1   SAB IAB Ectopic Molar Multiple Live Births   0 0 0 0 0 1      # Outcome Date GA Lbr Norbert/2nd Weight Sex Type Anes PTL Lv   2 Current            1 Term 23 37w4d  3380 g (7 lb 7.2 oz) F CS-LTranv Spinal N ADDISON      Birth Comments: panda or 3      Complications: Failure to Progress in First Stage      Name: AUGUSTO GONZALEZ      Apgar1: 8  Apgar5: 9       Past Medical History: Past Medical History:   Diagnosis Date    ADHD (attention deficit hyperactivity disorder)     Anxiety     Chiari I malformation     Scoliosis       Past Surgical History Past Surgical History:   Procedure Laterality Date     SECTION N/A 2023    Procedure:  SECTION PRIMARY;  Surgeon: Enriqueta Stokes MD;  Location: [facility] DELIVERY;  Service: Obstetrics/Gynecology;  Laterality: N/A;    MOUTH SURGERY      TONSILLECTOMY      WISDOM TOOTH EXTRACTION        Family History: Family History   Problem Relation Age of Onset    Melanoma Father     Anxiety disorder Father     Diabetes Father     Melanoma Mother     Diabetes Maternal Grandmother     Neuropathy Maternal Grandmother     Cancer Maternal Grandfather     Breast cancer Maternal Aunt     Cancer Maternal Aunt     Breast cancer Maternal Aunt     Breast cancer Maternal Aunt     Breast cancer Maternal Aunt     Anxiety disorder Sister     Anxiety disorder Sister     Cancer Maternal Aunt       Social History:  reports that she has never smoked. She has never been exposed to tobacco smoke. She has never used smokeless tobacco.   reports current alcohol use of about 3.0 standard drinks of alcohol per week.   reports no history of drug use.        General ROS: Pertinent items are noted in HPI, all other systems reviewed and negative    Objective       Vital Signs Range for the last 24 hours  Temperature:     Temp Source:     BP: BP: (138)/(71) 138/71   Pulse: Heart Rate:  [107] 107   Respirations:     SPO2:     O2  Amount (l/min):     O2 Devices     Weight: Weight:  [103 kg (226 lb 9.6 oz)] 103 kg (226 lb 9.6 oz)     Physical Examination: General appearance - alert, well appearing, and in no distress  Chest - clear to auscultation, no wheezes, rales or rhonchi, symmetric air entry  Heart - normal rate and regular rhythm  Abdomen -gravid uterus  Pelvic -deferred  Extremities - pedal edema trace +    Presentation: vertex   Cervix: Exam by:     Dilation:     Effacement:     Station:         Fetal Heart Rate Assessment   Method: Fetal HR Assessment Method: external (removed, NST complete preop)   Beats/min: Fetal HR (beats/min): 150   Baseline: Fetal HR Baseline: normal range   Variability: Fetal HR Variability: moderate (amplitude range 6 to 25 bpm)   Accels: Fetal HR Accelerations: episodic, greater than/equal to 15 bpm, lasting at least 15 seconds   Decels: Fetal HR Decelerations: absent   Tracing Category: Fetal HR Tracing Category: Category I     Uterine Assessment   Method: Method: external tocotransducer (removed, NST complete preop)   Frequency (min): Contraction Frequency (Minutes): irreg, pt denies   Ctx Count in 10 min:     Duration: Contraction Duration: 45-60 seconds   Intensity: Contraction Intensity: mild by palpation   Intensity by IUPC:     Resting Tone: Uterine Resting Tone: soft by palpation   Resting Tone by IUPC:     [place] Units:         Assessment & Plan       Previous  section    Pregnancy        Assessment:  1.  Intrauterine pregnancy at 39w2d gestation with reassuring fetal status.    2.  Previous  section  3.  Obstetrical history significant for is non-contributory.  4.  GBS status:   Strep Gp B Culture   Date Value Ref Range Status   2025 Negative Negative Final     Comment:     Centers for Disease Control and Prevention (CDC) and American Congress  of Obstetricians and Gynecologists (ACOG) guidelines for prevention of   group B streptococcal (GBS) disease specify  co-collection of  a vaginal and rectal swab specimen to maximize sensitivity of GBS  detection. Per the CDC and ACOG, swabbing both the lower vagina and  rectum substantially increases the yield of detection compared with  sampling the vagina alone.  Penicillin G, ampicillin, or cefazolin are indicated for intrapartum  prophylaxis of  GBS colonization. Reflex susceptibility  testing should be performed prior to use of clindamycin only on GBS  isolates from penicillin-allergic women who are considered a high risk  for anaphylaxis. Treatment with vancomycin without additional testing  is warranted if resistance to clindamycin is noted.         Plan:  1.  Plan is to proceed with repeat  section.  She was counseled on risks including risk of infection, bleeding, damage to surrounding structures, need for additional surgery if injury occurs and risk of anesthesia, blood clots, heart attack, and stroke.  She understands these risks and wishes to proceed.  2. Plan of care has been reviewed with patient and family.  3.  Risks, benefits of treatment plan have been discussed.  4.  All questions have been answered.        Akila Miranda MD  2025  10:28 EDT

## 2025-07-02 LAB
BASOPHILS # BLD AUTO: 0.05 10*3/MM3 (ref 0–0.2)
BASOPHILS NFR BLD AUTO: 0.4 % (ref 0–1.5)
DEPRECATED RDW RBC AUTO: 41.7 FL (ref 37–54)
EOSINOPHIL # BLD AUTO: 0.11 10*3/MM3 (ref 0–0.4)
EOSINOPHIL NFR BLD AUTO: 1 % (ref 0.3–6.2)
ERYTHROCYTE [DISTWIDTH] IN BLOOD BY AUTOMATED COUNT: 13.1 % (ref 12.3–15.4)
HCT VFR BLD AUTO: 26.4 % (ref 34–46.6)
HGB BLD-MCNC: 8.7 G/DL (ref 12–15.9)
IMM GRANULOCYTES # BLD AUTO: 0.05 10*3/MM3 (ref 0–0.05)
IMM GRANULOCYTES NFR BLD AUTO: 0.4 % (ref 0–0.5)
LYMPHOCYTES # BLD AUTO: 1.38 10*3/MM3 (ref 0.7–3.1)
LYMPHOCYTES NFR BLD AUTO: 12 % (ref 19.6–45.3)
MCH RBC QN AUTO: 28.9 PG (ref 26.6–33)
MCHC RBC AUTO-ENTMCNC: 33 G/DL (ref 31.5–35.7)
MCV RBC AUTO: 87.7 FL (ref 79–97)
MONOCYTES # BLD AUTO: 0.8 10*3/MM3 (ref 0.1–0.9)
MONOCYTES NFR BLD AUTO: 7 % (ref 5–12)
NEUTROPHILS NFR BLD AUTO: 79.2 % (ref 42.7–76)
NEUTROPHILS NFR BLD AUTO: 9.08 10*3/MM3 (ref 1.7–7)
NRBC BLD AUTO-RTO: 0 /100 WBC (ref 0–0.2)
PLATELET # BLD AUTO: 274 10*3/MM3 (ref 140–450)
PMV BLD AUTO: 10.3 FL (ref 6–12)
RBC # BLD AUTO: 3.01 10*6/MM3 (ref 3.77–5.28)
WBC NRBC COR # BLD AUTO: 11.47 10*3/MM3 (ref 3.4–10.8)

## 2025-07-02 PROCEDURE — 0503F POSTPARTUM CARE VISIT: CPT

## 2025-07-02 PROCEDURE — 85025 COMPLETE CBC W/AUTO DIFF WBC: CPT | Performed by: OBSTETRICS & GYNECOLOGY

## 2025-07-02 PROCEDURE — 25010000002 ENOXAPARIN PER 10 MG: Performed by: OBSTETRICS & GYNECOLOGY

## 2025-07-02 PROCEDURE — 25010000002 KETOROLAC TROMETHAMINE PER 15 MG: Performed by: OBSTETRICS & GYNECOLOGY

## 2025-07-02 RX ORDER — FERROUS SULFATE 325(65) MG
325 TABLET ORAL
Status: DISCONTINUED | OUTPATIENT
Start: 2025-07-02 | End: 2025-07-03 | Stop reason: HOSPADM

## 2025-07-02 RX ADMIN — KETOROLAC TROMETHAMINE 15 MG: 15 INJECTION INTRAMUSCULAR at 18:00

## 2025-07-02 RX ADMIN — ACETAMINOPHEN 1000 MG: 500 TABLET, FILM COATED ORAL at 01:42

## 2025-07-02 RX ADMIN — KETOROLAC TROMETHAMINE 15 MG: 15 INJECTION INTRAMUSCULAR at 12:19

## 2025-07-02 RX ADMIN — ACETAMINOPHEN 650 MG: 325 TABLET, FILM COATED ORAL at 15:14

## 2025-07-02 RX ADMIN — ACETAMINOPHEN 1000 MG: 500 TABLET, FILM COATED ORAL at 08:06

## 2025-07-02 RX ADMIN — DOCUSATE SODIUM 100 MG: 100 CAPSULE, LIQUID FILLED ORAL at 21:40

## 2025-07-02 RX ADMIN — KETOROLAC TROMETHAMINE 15 MG: 15 INJECTION INTRAMUSCULAR at 06:20

## 2025-07-02 RX ADMIN — FERROUS SULFATE TAB 325 MG (65 MG ELEMENTAL FE) 325 MG: 325 (65 FE) TAB at 13:45

## 2025-07-02 RX ADMIN — ACETAMINOPHEN 650 MG: 325 TABLET, FILM COATED ORAL at 21:39

## 2025-07-02 RX ADMIN — PRENATAL VITAMINS-IRON FUMARATE 27 MG IRON-FOLIC ACID 0.8 MG TABLET 1 TABLET: at 08:06

## 2025-07-02 RX ADMIN — ENOXAPARIN SODIUM 40 MG: 100 INJECTION SUBCUTANEOUS at 18:30

## 2025-07-02 NOTE — LACTATION NOTE
Lactation Consult Note  PT called for help with deeper latch. Reports her nipples are sore. Assisted mom with latching baby on the right breast in a football  position. Educated PT on starting nipple to nose to achieve deep latch and baby was able to achieve it quickly. Mom said it's still slightly pinching, so chin tug demonstrated and per PT it doesn't hurt at all. Infant is latching well,  and has a good jaw rotation. Educated on the importance of deep latch, hand expression, how to know if infant is getting enough and burping baby between breasts. Mother is able to express colostrum easily. She denies any questions. Encouraged to call if needing further help.       Evaluation Completed: 2025 11:40 EDT  Patient Name: Rosalina Pardo  :  2000  MRN:  [id number]     REFERRAL  INFORMATION:                          Date of Referral: 25   Person Making Referral: patient  Maternal Reason for Referral: breastfeeding currently, nipple symptoms (L. nipple blister)  Infant Reason for Referral: sleepy, other (see comments) (help with deeper latch)    DELIVERY HISTORY:        Skin to skin initiation date/time:      Skin to skin end date/time:           MATERNAL ASSESSMENT:  Breast Size Issue: none (25)  Breast Shape: Bilateral:, pendulous (25)  Breast Density: Bilateral:, soft (25)  Areola: Bilateral:, elastic (25)  Nipples: Bilateral:, everted, graspable (25)     Left Nipple Symptoms: blisters, tender (25)          INFANT ASSESSMENT:  Information for the patient's :  Melody Pardo [[id number]]   No past medical history on file.  Feeding Readiness Cues: sleeping (25)     Feeding Tolerance/Success: arousal required, sleepy, strong suck, suck inconsistent (25)              Feeding Interventions: arousal required, jaw supported, latch assistance provided, lips stroked, sucking promoted (25)               Breastfeeding: breastfeeding, right side only (25)  Infant Positioning: clutch/football (25)        Effective Latch During Feeding: yes (25)  Suck/Swallow/Breathing Coordination: present (25)  Signs of Milk Transfer: deep jaw excursions noted (25)      Latch: 2-->grasps breast, tongue down, lips flanged, rhythmic sucking (25)  Audible Swallowin-->a few with stimulation (25)  Type of Nipple: 2-->everted (after stimulation) (25)  Comfort (Breast/Nipple): 2-->soft/nontender (25)  Hold (Positioning): 1-->minimal assist, teach one side, mother does other, staff holds (25)  Latch Score: 8 (25)                   MATERNAL INFANT FEEDING:     Maternal Emotional State: receptive, relaxed (25)  Infant Positioning: clutch/football (25)   Signs of Milk Transfer: deep jaw excursions noted (25)  Pain with Feeding: no (25)           Milk Ejection Reflex: present (25)           Latch Assistance: minimal assistance (25)                               EQUIPMENT TYPE:                                 BREAST PUMPING:          LACTATION REFERRALS:

## 2025-07-02 NOTE — LACTATION NOTE
This note was copied from a baby's chart.  P2, T Family sleeping. Per RN baby is bf well, bgm completed( LGA). LC number is on the board and pt has a PBP.

## 2025-07-02 NOTE — PROGRESS NOTES
PROGRESS NOTE:   POSTOP DAY 1      PATIENT: Rosalina Pardo        MR#:[id number]  LOCATION: [facility]  DATE OF ADMISSION: 2025  ADMISSION  DIAGNOSIS:   S/P  section    Previous  section    Pregnancy    Previous  delivery affecting pregnancy     CURRENT DIAGNOSIS: No notes have been filed under this hospital service.  Service: Hospitalist    SERVICE: Obstetrics      S/P  section    Previous  section    Pregnancy    Previous  delivery affecting pregnancy        PROCEDURES:  , Low Transverse    2025   11:15 AM     ASSESSMENT/PLAN  Status Post  Delivery Day 1:   Postpartum care immediately following  delivery: Doing well postoperatively. Continue routine postoperative and supportive care. Discontinue IV, advance diet, may shower.  DVT Prophylaxis: BMI 38.90. SCD's while at rest. On lovenox for DVT prevention. Encouraged ambulation.   Postpartum anemia: hgb prior to delivery 10.2, 8.7 on postpartum.  mL.  Acute on chronic. Asymptomatic. PO ferrous sulfate added to daily medications.     RH STATUS: O positive  SYPHILIS SCREEN DELIVERY ADMIT: treponemal antibody non-reactive upon admission  RUBELLA: immune  VARICELLA: unknown immunity  INFANT GENDER: female    Subjective    25 y.o. yo Female  status post  section day 1 at 39w2d doing well. Rosalina denies any emotional concerns. Pain well controlled with current medications. Lochia appropriate. She is tolerating a regular diet and passing flatus. Urinary output has been adequate.         Objective   Vitals  Temp:  Temp:  [97.4 °F (36.3 °C)-98.5 °F (36.9 °C)] 97.9 °F (36.6 °C)  Temp src: Oral  BP:  BP: ()/(52-67) 92/53  Pulse:  Heart Rate:  [64-94] 80  RR:   Resp:  [16-17] 16    General:  Awake, alert, no acute distress   Cardiac: Regular rate and rhythm    Respiratory: Lungs clear bilaterally, normal respiratory effort    Abdomen: Soft,  non-distended, fundus firm, below umbilicus, appropriately tender   Incision: Healing well, no dehiscence, no significant drainage, no erythema or exudate   Pelvis: deferred   Extremities: Calves NT bilaterally, DTR 2+, no clonus noted, trace edema     I/O last 3 completed shifts:  In: 2600 [I.V.:2100; IV Piggyback:500]  Out: 2329 [Urine:2075; Blood:254]  Lab Results   Component Value Date    WBC 11.47 (H) 07/02/2025    HGB 8.7 (L) 07/02/2025    HCT 26.4 (L) 07/02/2025    MCV 87.7 07/02/2025     07/02/2025    GLU 84 05/25/2021    CREATININE 0.67 07/01/2025    AST 16 07/01/2025    ALT 6 07/01/2025    HEPBSAG Negative 11/20/2024     Results from last 7 days   Lab Units 07/01/25  0839   ABO TYPING  O   RH TYPING  Positive   ANTIBODY SCREEN  Negative       Eliz Nielsen CNM  7/2/2025   12:55 EDT

## 2025-07-02 NOTE — LACTATION NOTE
Mom called again to make sure the latch is deep enough. This time baby is BF on the left breast, where mom has a blister on the nipple. Chin tug performed and PT said it feels better. Baby is latching well.

## 2025-07-02 NOTE — LACTATION NOTE
Mom called for LC assist.  Reports baby just fed on left breast for 30 min now has nipple damage and bleeding.  Appears to have a small blister/scab.  Ordered APNO and educated on use.  Lanolin given as well.  Encouraged to call for latch assistance next feeding and educated on importance of a deep latch.  LC number on whiteboard.

## 2025-07-03 VITALS
DIASTOLIC BLOOD PRESSURE: 77 MMHG | BODY MASS INDEX: 38.68 KG/M2 | OXYGEN SATURATION: 97 % | HEART RATE: 76 BPM | RESPIRATION RATE: 16 BRPM | WEIGHT: 226.6 LBS | SYSTOLIC BLOOD PRESSURE: 114 MMHG | TEMPERATURE: 98.4 F | HEIGHT: 64 IN

## 2025-07-03 PROCEDURE — 0503F POSTPARTUM CARE VISIT: CPT

## 2025-07-03 RX ORDER — IBUPROFEN 600 MG/1
600 TABLET, FILM COATED ORAL EVERY 6 HOURS
Qty: 60 TABLET | Refills: 0 | Status: SHIPPED | OUTPATIENT
Start: 2025-07-03

## 2025-07-03 RX ORDER — PSEUDOEPHEDRINE HCL 30 MG
100 TABLET ORAL 2 TIMES DAILY PRN
Qty: 60 CAPSULE | Refills: 2 | Status: SHIPPED | OUTPATIENT
Start: 2025-07-03

## 2025-07-03 RX ORDER — SIMETHICONE 80 MG
80 TABLET,CHEWABLE ORAL 4 TIMES DAILY PRN
Status: DISCONTINUED | OUTPATIENT
Start: 2025-07-03 | End: 2025-07-03 | Stop reason: HOSPADM

## 2025-07-03 RX ORDER — FERROUS SULFATE 325(65) MG
325 TABLET ORAL
Qty: 90 TABLET | Refills: 0 | Status: SHIPPED | OUTPATIENT
Start: 2025-07-04

## 2025-07-03 RX ORDER — OXYCODONE AND ACETAMINOPHEN 5; 325 MG/1; MG/1
1 TABLET ORAL EVERY 8 HOURS PRN
Qty: 9 TABLET | Refills: 0 | Status: SHIPPED | OUTPATIENT
Start: 2025-07-03

## 2025-07-03 RX ADMIN — OXYCODONE HYDROCHLORIDE 10 MG: 10 TABLET ORAL at 11:31

## 2025-07-03 RX ADMIN — IBUPROFEN 600 MG: 600 TABLET ORAL at 00:38

## 2025-07-03 RX ADMIN — IBUPROFEN 600 MG: 600 TABLET ORAL at 06:06

## 2025-07-03 RX ADMIN — PRENATAL VITAMINS-IRON FUMARATE 27 MG IRON-FOLIC ACID 0.8 MG TABLET 1 TABLET: at 08:18

## 2025-07-03 RX ADMIN — SIMETHICONE 80 MG: 80 TABLET, CHEWABLE ORAL at 02:04

## 2025-07-03 RX ADMIN — ACETAMINOPHEN 650 MG: 325 TABLET, FILM COATED ORAL at 03:14

## 2025-07-03 RX ADMIN — ACETAMINOPHEN 650 MG: 325 TABLET, FILM COATED ORAL at 10:28

## 2025-07-03 RX ADMIN — OXYCODONE HYDROCHLORIDE 10 MG: 10 TABLET ORAL at 06:06

## 2025-07-03 RX ADMIN — OXYCODONE 5 MG: 5 TABLET ORAL at 00:43

## 2025-07-03 RX ADMIN — FERROUS SULFATE TAB 325 MG (65 MG ELEMENTAL FE) 325 MG: 325 (65 FE) TAB at 08:18

## 2025-07-03 NOTE — PROGRESS NOTES
PROGRESS NOTE:   POSTOP DAY 2      PATIENT: Rosalina Pardo        MR#:[id number]  LOCATION: [facility]  DATE OF ADMISSION: 2025  ADMISSION  DIAGNOSIS:   S/P  section    Previous  section    Pregnancy    Previous  delivery affecting pregnancy     CURRENT DIAGNOSIS: No notes have been filed under this hospital service.  Service: Hospitalist    SERVICE: Obstetrics      S/P  section    Previous  section    Pregnancy    Previous  delivery affecting pregnancy        PROCEDURES:  , Low Transverse    2025   11:15 AM       Status Post  Delivery Day 2:   Postpartum care immediately following  delivery day 2: Doing well postoperatively. Continue routine postoperative and supportive care. Discontinue IV, advance diet, may shower. Requests discharge home today. Meeting all milestones for discharge.   DVT Prophylaxis: BMI 38.90. SCD's while at rest. On lovenox for DVT prevention. Encouraged ambulation.   Postpartum anemia: hgb prior to delivery 10.2, 8.7 on postpartum.  mL.  Acute on chronic. Asymptomatic. PO ferrous sulfate added to daily medications.     RH STATUS: O positive  SYPHILIS SCREEN DELIVERY ADMIT: treponemal antibody non-reactive upon admission  RUBELLA: immune  VARICELLA: unknown immunity  INFANT GENDER: female    Subjective     25 y.o. yo Female  status post  section day 2 at 39w2d doing well. Pain well controlled. Lochia appropriate. The patient requests discharge home today.     Objective   Vitals  Temp:  Temp:  [97.8 °F (36.6 °C)] 97.8 °F (36.6 °C)  Temp src: Oral  BP:  BP: (106)/(72) 106/72  Pulse:  Heart Rate:  [85] 85  RR:   Resp:  [16] 16    General:  Awake, alert, no acute distress   Cardiac: Regular rate and rhythm    Respiratory: Lungs clear bilaterally, normal respiratory effort    Abdomen: Soft, non-distended, fundus firm, below umbilicus, appropriately tender   Incision: Healing  well, no dehiscence, no significant drainage, no erythema or exudate   Pelvis: deferred   Extremities: Calves NT bilaterally, DTR 2+, no clonus noted, trace edema     I/O last 3 completed shifts:  In: 750 [P.O.:750]  Out: 2025 [Urine:2025]  Lab Results   Component Value Date    WBC 11.47 (H) 07/02/2025    HGB 8.7 (L) 07/02/2025    HCT 26.4 (L) 07/02/2025    MCV 87.7 07/02/2025     07/02/2025    GLU 84 05/25/2021    CREATININE 0.67 07/01/2025    AST 16 07/01/2025    ALT 6 07/01/2025    HEPBSAG Negative 11/20/2024     Results from last 7 days   Lab Units 07/01/25  0839   ABO TYPING  O   RH TYPING  Positive   ANTIBODY SCREEN  Negative           Eliz Nielsen CNM  7/3/2025   08:12 EDT

## 2025-07-03 NOTE — DISCHARGE SUMMARY
SECTION DISCHARGE SUMMARY    PATIENT: Rosalina Pardo        MR#:[id number]  LOCATION: [facility]  ADMISSION  DIAGNOSIS: S/P  section  DISCHARGE DIAGNOSIS: No diagnosis found.  SERVICE: Obstetrics    DATE OF ADMISSION: 2025  DATE OF DISCHARGE:  25       S/P  section    Previous  section    Pregnancy    Previous  delivery affecting pregnancy        PROCEDURES:  , Low Transverse    2025   11:15 AM     RH STATUS: O positive  SYPHILIS SCREEN DELIVERY ADMIT: treponemal antibody non-reactive upon admission  RUBELLA: immune  VARICELLA: unknown immunity  INFANT GENDER: female    SERVICE: Obstetrics    HOSPITAL COURSE:  Rosalina underwent  section of a female infant and remained in the hospital for 2 days. During that time, Rosalina remained afebrile and hemodynamically stable. On the day of discharge, Rosalina was eating, ambulating, passing flatus, and voiding without difficulty.  Her hemoglobin was 8.7 postpartum and she was started on oral ferrous sulfate for her anemia.     LABS:    Lab Results (last 24 hours)       Procedure Component Value Units Date/Time    CBC & Differential [[id number]]  (Abnormal) Collected: 25    Specimen: Blood Updated: 25    Narrative:      The following orders were created for panel order CBC & Differential.  Procedure                               Abnormality         Status                     ---------                               -----------         ------                     CBC Auto Differential[[id number]]        Abnormal            Final result                 Please view results for these tests on the individual orders.    CBC Auto Differential [[id number]]  (Abnormal) Collected: 25    Specimen: Blood Updated: 25     WBC 11.47 10*3/mm3      RBC 3.01 10*6/mm3      Hemoglobin 8.7 g/dL      Hematocrit 26.4 %      MCV 87.7 fL      MCH 28.9 pg      MCHC 33.0 g/dL      RDW  13.1 %      RDW-SD 41.7 fl      MPV 10.3 fL      Platelets 274 10*3/mm3      Neutrophil % 79.2 %      Lymphocyte % 12.0 %      Monocyte % 7.0 %      Eosinophil % 1.0 %      Basophil % 0.4 %      Immature Grans % 0.4 %      Neutrophils, Absolute 9.08 10*3/mm3      Lymphocytes, Absolute 1.38 10*3/mm3      Monocytes, Absolute 0.80 10*3/mm3      Eosinophils, Absolute 0.11 10*3/mm3      Basophils, Absolute 0.05 10*3/mm3      Immature Grans, Absolute 0.05 10*3/mm3      nRBC 0.0 /100 WBC             DISCHARGE MEDICATIONS     Discharge Medications        ASK your doctor about these medications        Instructions Start Date   prenatal vitamin 27-0.8 27-0.8 MG tablet tablet   Daily               DISCHARGE DISPOSITION:  Home    DISCHARGE CONDITION: Stable    DISCHARGE DIET: Regular    ACTIVITY AT DISCHARGE: Pelvic rest    INFANT FEEDING PLANS: Breast    EDUCATION: Warning signs and symptoms given, no tub baths, nothing in the vagina for 6 weeks, no driving for 2 weeks while on narcotics.     FOLLOW-UP APPOINTMENTS: Follow up with [facility] in 2 weeks for incision check with Dr. Miranda   in 4 to 6 weeks for routine postpartum visit.    Eliz Nielsen CNM  07/03/25  08:12 EDT

## 2025-07-04 ENCOUNTER — MATERNAL SCREENING (OUTPATIENT)
Dept: CALL CENTER | Facility: HOSPITAL | Age: 25
End: 2025-07-04
Payer: COMMERCIAL

## 2025-07-04 NOTE — OUTREACH NOTE
Maternal Screening Survey      Flowsheet Row Responses   Eligibility Eligible   Prep survey completed? Yes   Facility patient discharged from? [facility] - Registered Nurse

## 2025-07-07 ENCOUNTER — TELEPHONE (OUTPATIENT)
Dept: INTERNAL MEDICINE | Facility: CLINIC | Age: 25
End: 2025-07-07

## 2025-07-08 RX ORDER — ESCITALOPRAM OXALATE 20 MG/1
20 TABLET ORAL DAILY
Qty: 30 TABLET | Refills: 1 | Status: SHIPPED | OUTPATIENT
Start: 2025-07-08

## 2025-07-14 ENCOUNTER — MATERNAL SCREENING (OUTPATIENT)
Dept: CALL CENTER | Facility: HOSPITAL | Age: 25
End: 2025-07-14
Payer: COMMERCIAL

## 2025-07-14 NOTE — OUTREACH NOTE
Maternal Screening Survey      Flowsheet Row Responses   Facility patient discharged from? [facility]   Attempt successful? No   Unsuccessful attempts Attempt 1              MAURISIO GRIFFITHS - Registered Nurse

## 2025-07-14 NOTE — OUTREACH NOTE
Maternal Screening Survey      Flowsheet Row Responses   Facility patient discharged from? [facility]   Attempt successful? No   Unsuccessful attempts Attempt 2              MAURISIO GRIFFITHS - Registered Nurse

## 2025-07-15 ENCOUNTER — MATERNAL SCREENING (OUTPATIENT)
Dept: CALL CENTER | Facility: HOSPITAL | Age: 25
End: 2025-07-15
Payer: COMMERCIAL

## 2025-07-15 NOTE — OUTREACH NOTE
Maternal Screening Survey      Flowsheet Row Responses   [facility]   Attempt successful? Yes   Call start time 1310   Call end time 1313   I have been able to laugh and see the funny side of things. 0   I have looked forward with enjoyment to things. 0   I have blamed myself unnecessarily when things went wrong. 0   I have been anxious or worried for no good reason. 0   I have felt scared or panicky for no good reason. 0   Things have been getting on top of me. 0   I have been so unhappy that I have had difficulty sleeping. 0   I have felt sad or miserable. 0   I have been so unhappy that I have been crying. 0   The thought of harming myself has occurred to me. 0   [place]  Depression Scale Total 0   Did any of your parents have problems with alcohol or drug use? No   Do any of your peers have problems with alcohol or drug use? No   Does your partner have problems with alcohol or drug use? No   Before you were pregnant did you have problems with alcohol or drug use? (past) No   In the past month, did you drink beer, wine, liquor or use any other drugs? (pregnancy) No   Maternal Screening call completed Yes   Wrap up additional comments Pt states she takes antianxiety meds and feels she is well controlled.  No questions.   Call end time 1313              JONAS DAVIS - Registered Nurse          
wound

## 2025-07-23 ENCOUNTER — POSTPARTUM VISIT (OUTPATIENT)
Dept: OBSTETRICS AND GYNECOLOGY | Facility: CLINIC | Age: 25
End: 2025-07-23
Payer: COMMERCIAL

## 2025-07-23 VITALS
SYSTOLIC BLOOD PRESSURE: 118 MMHG | HEIGHT: 64 IN | HEART RATE: 68 BPM | DIASTOLIC BLOOD PRESSURE: 78 MMHG | BODY MASS INDEX: 34.66 KG/M2 | WEIGHT: 203 LBS

## 2025-07-23 DIAGNOSIS — Z48.89 ENCOUNTER FOR POSTOPERATIVE WOUND CHECK: Primary | ICD-10-CM

## 2025-07-23 DIAGNOSIS — Z98.891 S/P CESAREAN SECTION: ICD-10-CM

## 2025-07-23 NOTE — PROGRESS NOTES
"Chief Complaint  Postpartum Care- 2 wk incision check    Subjective        Rosalina Pardo presents to [facility]  History of Present Illness  Patient is 3 weeks status post repeat  at 39 weeks.  Her pregnancy was uncomplicated.  She has no complaints today.  She is formula feeding.  She is no longer requiring pain medication.  She states her moods are well-controlled on Lexapro.  She has normal bowel and bladder function.  Objective   Vital Signs:  /78   Pulse 68   Ht 162.6 cm (64\")   Wt 92.1 kg (203 lb)   BMI 34.84 kg/m²   Estimated body mass index is 34.84 kg/m² as calculated from the following:    Height as of this encounter: 162.6 cm (64\").    Weight as of this encounter: 92.1 kg (203 lb).            Physical Exam  Vitals reviewed.   Constitutional:       Appearance: She is well-developed.   Pulmonary:      Effort: Pulmonary effort is normal.   Abdominal:      General: There is no distension.      Palpations: Abdomen is soft.      Tenderness: There is no abdominal tenderness. There is no guarding or rebound.      Comments: Incision intact with no redness, drainage, or tenderness     Neurological:      Mental Status: She is alert.   Psychiatric:         Mood and Affect: Mood normal.         Behavior: Behavior normal.        Result Review :                Assessment and Plan   Diagnoses and all orders for this visit:    1. Encounter for postoperative wound check (Primary)    2. S/P  section      Incision is healing well with no signs of infection.  Discussed continued postoperative wound care and postoperative restrictions.  She will follow-up in 3 weeks.       Follow Up   Return in about 3 weeks (around 2025).  Patient was given instructions and counseling regarding her condition or for health maintenance advice. Please see specific information pulled into the AVS if appropriate.             "

## 2025-08-13 ENCOUNTER — POSTPARTUM VISIT (OUTPATIENT)
Dept: OBSTETRICS AND GYNECOLOGY | Facility: CLINIC | Age: 25
End: 2025-08-13
Payer: COMMERCIAL

## 2025-08-13 VITALS
HEIGHT: 64 IN | WEIGHT: 199.4 LBS | SYSTOLIC BLOOD PRESSURE: 105 MMHG | HEART RATE: 121 BPM | DIASTOLIC BLOOD PRESSURE: 64 MMHG | BODY MASS INDEX: 34.04 KG/M2

## 2025-08-13 PROBLEM — Z34.90 PREGNANCY: Status: RESOLVED | Noted: 2025-07-01 | Resolved: 2025-08-13

## 2025-08-13 PROBLEM — Z98.891 S/P CESAREAN SECTION: Status: RESOLVED | Noted: 2025-07-01 | Resolved: 2025-08-13

## 2025-08-13 PROBLEM — Z98.891 PREVIOUS CESAREAN SECTION: Status: RESOLVED | Noted: 2024-11-20 | Resolved: 2025-08-13

## 2025-08-13 PROBLEM — O09.299 HISTORY OF OLIGOHYDRAMNIOS IN PRIOR PREGNANCY, CURRENTLY PREGNANT: Status: RESOLVED | Noted: 2025-03-12 | Resolved: 2025-08-13

## 2025-08-13 PROBLEM — O34.219 PREVIOUS CESAREAN DELIVERY AFFECTING PREGNANCY: Status: RESOLVED | Noted: 2025-07-01 | Resolved: 2025-08-13

## 2025-08-14 ENCOUNTER — OFFICE VISIT (OUTPATIENT)
Dept: INTERNAL MEDICINE | Facility: CLINIC | Age: 25
End: 2025-08-14
Payer: COMMERCIAL

## 2025-08-14 VITALS
BODY MASS INDEX: 33.97 KG/M2 | WEIGHT: 199 LBS | HEIGHT: 64 IN | SYSTOLIC BLOOD PRESSURE: 115 MMHG | DIASTOLIC BLOOD PRESSURE: 72 MMHG

## 2025-08-14 DIAGNOSIS — F90.2 ATTENTION DEFICIT HYPERACTIVITY DISORDER (ADHD), COMBINED TYPE: ICD-10-CM

## 2025-08-14 DIAGNOSIS — D64.9 ANEMIA, UNSPECIFIED TYPE: Primary | ICD-10-CM

## 2025-08-14 PROCEDURE — 99214 OFFICE O/P EST MOD 30 MIN: CPT | Performed by: STUDENT IN AN ORGANIZED HEALTH CARE EDUCATION/TRAINING PROGRAM

## 2025-08-14 RX ORDER — LISDEXAMFETAMINE DIMESYLATE 30 MG/1
30 CAPSULE ORAL EVERY MORNING
Qty: 30 CAPSULE | Refills: 0 | Status: SHIPPED | OUTPATIENT
Start: 2025-08-14

## (undated) DEVICE — SOL IRR NACL 0.9PCT BT 1000ML

## (undated) DEVICE — SUT VIC 0 CT 36IN J958H

## (undated) DEVICE — SLV SCD CALF HEMOFORCE DVT THERP REPROC MD

## (undated) DEVICE — 3M(TM) TEGADERM(TM) TRANSPARENT FILM DRESSING FRAME STYLE 1627: Brand: 3M™ TEGADERM™

## (undated) DEVICE — SUT MNCRYL 3/0 KS 27IN UD MCP523H

## (undated) DEVICE — SUT MNCRYL PLS ANTIB UD 4/0 PS2 18IN

## (undated) DEVICE — EXOFIN PRECISION PEN HIGH VISCOSITY TOPICAL SKIN ADHESIVE: Brand: EXOFIN PRECISION PEN, 1G

## (undated) DEVICE — SUT MNCRYL 3/0 CT1 36 IN Y944H

## (undated) DEVICE — GLV SURG BIOGEL LTX PF 6

## (undated) DEVICE — ANTIBACTERIAL UNDYED BRAIDED (POLYGLACTIN 910), SYNTHETIC ABSORBABLE SUTURE: Brand: COATED VICRYL

## (undated) DEVICE — GLV SURG BIOGEL LTX PF 6 1/2

## (undated) DEVICE — SUT MNCRYL 0/0 CTX 36IN Y398H

## (undated) DEVICE — SOL IRR H2O BTL 1000ML STRL

## (undated) DEVICE — SOL IRR H2O BO 1000ML STRL